# Patient Record
Sex: MALE | Race: WHITE | NOT HISPANIC OR LATINO | Employment: UNEMPLOYED | URBAN - NONMETROPOLITAN AREA
[De-identification: names, ages, dates, MRNs, and addresses within clinical notes are randomized per-mention and may not be internally consistent; named-entity substitution may affect disease eponyms.]

---

## 2020-07-22 ENCOUNTER — OFFICE VISIT (OUTPATIENT)
Dept: FAMILY MEDICINE CLINIC | Facility: CLINIC | Age: 50
End: 2020-07-22

## 2020-07-22 VITALS
DIASTOLIC BLOOD PRESSURE: 87 MMHG | HEART RATE: 99 BPM | OXYGEN SATURATION: 94 % | WEIGHT: 315 LBS | SYSTOLIC BLOOD PRESSURE: 140 MMHG | BODY MASS INDEX: 38.36 KG/M2 | TEMPERATURE: 96.9 F | HEIGHT: 76 IN

## 2020-07-22 DIAGNOSIS — R53.83 FATIGUE, UNSPECIFIED TYPE: ICD-10-CM

## 2020-07-22 DIAGNOSIS — I10 ESSENTIAL HYPERTENSION: ICD-10-CM

## 2020-07-22 DIAGNOSIS — E66.01 CLASS 3 SEVERE OBESITY WITH SERIOUS COMORBIDITY AND BODY MASS INDEX (BMI) OF 40.0 TO 44.9 IN ADULT, UNSPECIFIED OBESITY TYPE (HCC): Primary | ICD-10-CM

## 2020-07-22 DIAGNOSIS — Z12.11 SCREEN FOR COLON CANCER: ICD-10-CM

## 2020-07-22 DIAGNOSIS — F32.1 CURRENT MODERATE EPISODE OF MAJOR DEPRESSIVE DISORDER WITHOUT PRIOR EPISODE (HCC): ICD-10-CM

## 2020-07-22 DIAGNOSIS — Z11.59 NEED FOR HEPATITIS C SCREENING TEST: ICD-10-CM

## 2020-07-22 DIAGNOSIS — G47.33 OSA (OBSTRUCTIVE SLEEP APNEA): ICD-10-CM

## 2020-07-22 DIAGNOSIS — M25.551 RIGHT HIP PAIN: ICD-10-CM

## 2020-07-22 PROCEDURE — 99214 OFFICE O/P EST MOD 30 MIN: CPT | Performed by: NURSE PRACTITIONER

## 2020-07-22 RX ORDER — BENAZEPRIL HYDROCHLORIDE 20 MG/1
20 TABLET ORAL DAILY
COMMUNITY
End: 2020-07-22 | Stop reason: SDUPTHER

## 2020-07-22 RX ORDER — BENAZEPRIL HYDROCHLORIDE 20 MG/1
20 TABLET ORAL DAILY
Qty: 30 TABLET | Refills: 5 | Status: SHIPPED | OUTPATIENT
Start: 2020-07-22 | End: 2020-08-13 | Stop reason: SDUPTHER

## 2020-07-22 RX ORDER — DICLOFENAC SODIUM 75 MG/1
75 TABLET, DELAYED RELEASE ORAL 2 TIMES DAILY PRN
Qty: 60 TABLET | Refills: 1 | Status: ON HOLD | OUTPATIENT
Start: 2020-07-22 | End: 2020-09-02

## 2020-07-22 NOTE — PROGRESS NOTES
New Patient History and Physical      Referring Physician: No ref. provider found    Subjective     Chief Complaint:    Chief Complaint   Patient presents with   • Establish Care     New Patient, Establish care with Vicki   • Arthritis     Complaints of arthritis in right hip. Patient states that this has been an on going issue.   • Hypertension   • Sleep Apnea     Complaints of  Sleep Apnea       History of Present Illness:   Moved to [place] in Jan. Was working at [employer] but is no longer working. He notes that he has right hip pain. He did have hip surgery about 25 years ago. He has arthritis. Had injection about 2 years ago and it helped a lot. Over the past 4 months the hip pain has returned. Hurts in his right knee as well. Worse with sitting. Hurts when he walks a lot.     He has hx of menigitis and notes he has some trouble with his memory.   Has hx of sleep apnea. Has CPAP machine and is compliants. Has not been checked for over 4 years. Still having excessive daytime sleepiness.   Has HTN. Takes benazepril. Took his med yesterday.   Has gained almost 30 lbs in the past 2 months.  Notes depression.   [id number] 13.     Review of Systems   Gen- No fevers, chills  CV- No chest pain, palpitations  Resp- No cough, dyspnea  GI- No N/V/D, abd pain  Neuro-No dizziness, headaches    Past Medical History:   Past Medical History:   Diagnosis Date   • Hypertension    • Meningitis    • NATALIE (obstructive sleep apnea)        Past Surgical History:  Past Surgical History:   Procedure Laterality Date   • HIP SURGERY     • UMBILICAL HERNIA REPAIR  2019       Family History: family history includes Diabetes in his father; Heart disease in his mother.    Social History:  reports that he has been smoking cigarettes. He has been smoking about 1.00 pack per day. He has never used smokeless tobacco. He reports that he drank alcohol.    Medications:    Current Outpatient Medications:   •  albuterol sulfate  (90 Base)  "MCG/ACT inhaler, Inhale 2 puffs Every 4 (Four) Hours As Needed for Wheezing., Disp: 1 inhaler, Rfl: 0  •  aspirin 81 MG EC tablet, Take 81 mg by mouth Daily., Disp: , Rfl:   •  benazepril (LOTENSIN) 20 MG tablet, Take 1 tablet by mouth Daily., Disp: 30 tablet, Rfl: 5  •  diclofenac (VOLTAREN) 75 MG EC tablet, Take 1 tablet by mouth 2 (Two) Times a Day As Needed (pain)., Disp: 60 tablet, Rfl: 1    Allergies:  Allergies   Allergen Reactions   • Erythromycin Photosensitivity     headache       Objective     Vital Signs:   Vitals:    07/22/20 0904   BP: 140/87   Pulse: 99   Temp: 96.9 °F (36.1 °C)   SpO2: 94%   Weight: (!) 163 kg (360 lb 6 oz)   Height: 193 cm (75.98\")       Physical Exam:    Physical Exam   Constitutional: He is oriented to person, place, and time. He appears well-developed and well-nourished.   HENT:   Head: Normocephalic and atraumatic.   Right Ear: Tympanic membrane, external ear and ear canal normal.   Left Ear: Tympanic membrane, external ear and ear canal normal.   Eyes: Pupils are equal, round, and reactive to light.   Neck: Neck supple.   Cardiovascular: Normal rate, regular rhythm and normal heart sounds. Exam reveals no gallop and no friction rub.   No murmur heard.  Pulmonary/Chest: Effort normal and breath sounds normal.   Abdominal: Soft. Bowel sounds are normal. There is no tenderness.   Musculoskeletal:   Decreased mobility of right leg.  Abnormal gait   Lymphadenopathy:        Head (right side): No submental, no submandibular, no tonsillar, no preauricular and no posterior auricular adenopathy present.        Head (left side): No submental, no submandibular, no tonsillar, no preauricular and no posterior auricular adenopathy present.     He has no cervical adenopathy.   Neurological: He is alert and oriented to person, place, and time.   Skin: Skin is warm and dry.   Psychiatric: He has a normal mood and affect. His behavior is normal.       Assessment / Plan     Assessment/Plan:   "   Problem List Items Addressed This Visit        Cardiovascular and Mediastinum    Hypertension    Relevant Medications    benazepril (LOTENSIN) 20 MG tablet    Other Relevant Orders    Comprehensive Metabolic Panel    CBC Auto Differential       Respiratory    NATALIE (obstructive sleep apnea)    Relevant Orders    Ambulatory Referral to Sleep Medicine       Other    Class 3 severe obesity with serious comorbidity and body mass index (BMI) of 40.0 to 44.9 in adult (CMS/HCC) - Primary    Relevant Orders    Hemoglobin A1c    Lipid Panel    Current moderate episode of major depressive disorder without prior episode (CMS/HCC)    Relevant Orders    Ambulatory Referral to Behavioral Health      Other Visit Diagnoses     Screen for colon cancer        Relevant Orders    Ambulatory Referral For Screening Colonoscopy    Need for hepatitis C screening test        Relevant Orders    Hepatitis C Antibody    Fatigue, unspecified type        Relevant Orders    TSH    Right hip pain        Relevant Medications    diclofenac (VOLTAREN) 75 MG EC tablet    Other Relevant Orders    Vitamin D 25 Hydroxy    XR Hip With or Without Pelvis 2 - 3 View Right    Ambulatory Referral to Orthopedic Surgery        --Orders as above  --He declines any medication for depression.  We discussed counseling and he is agreeable  --Start diclofenac for pain.  No other NSAIDs while taking diclofenac.  Check hip x-ray.  Referral to Ortho for joint injection.  I have reviewed pertinent health maintenance applicable to this patient.  --Continue benazepril. Meds, diet, and lifestyle recommendation discussed at length. Home BP monitoring encouraged and appropriate intervals discussed.     Follow up:  Return in about 6 weeks (around 9/2/2020).    Electronically signed by AMA Ewing   07/22/2020 9:17 AM      Please note that portions of this note may have been completed with a voice recognition program. Efforts were made to edit the dictations, but  occasionally words are mistranscribed.

## 2020-07-23 LAB
25(OH)D3+25(OH)D2 SERPL-MCNC: 20.1 NG/ML (ref 30–100)
ALBUMIN SERPL-MCNC: 4.6 G/DL (ref 3.5–5.2)
ALBUMIN/GLOB SERPL: 1.4 G/DL
ALP SERPL-CCNC: 85 U/L (ref 39–117)
ALT SERPL-CCNC: 65 U/L (ref 1–41)
AST SERPL-CCNC: 27 U/L (ref 1–40)
BASOPHILS # BLD AUTO: 0.04 10*3/MM3 (ref 0–0.2)
BASOPHILS NFR BLD AUTO: 0.6 % (ref 0–1.5)
BILIRUB SERPL-MCNC: 0.3 MG/DL (ref 0–1.2)
BUN SERPL-MCNC: 10 MG/DL (ref 6–20)
BUN/CREAT SERPL: 13.5 (ref 7–25)
CALCIUM SERPL-MCNC: 10 MG/DL (ref 8.6–10.5)
CHLORIDE SERPL-SCNC: 102 MMOL/L (ref 98–107)
CHOLEST SERPL-MCNC: 217 MG/DL (ref 0–200)
CO2 SERPL-SCNC: 23.7 MMOL/L (ref 22–29)
CREAT SERPL-MCNC: 0.74 MG/DL (ref 0.76–1.27)
EOSINOPHIL # BLD AUTO: 0.22 10*3/MM3 (ref 0–0.4)
EOSINOPHIL NFR BLD AUTO: 3.1 % (ref 0.3–6.2)
ERYTHROCYTE [DISTWIDTH] IN BLOOD BY AUTOMATED COUNT: 13 % (ref 12.3–15.4)
GLOBULIN SER CALC-MCNC: 3.2 GM/DL
GLUCOSE SERPL-MCNC: 153 MG/DL (ref 65–99)
HBA1C MFR BLD: 7.79 % (ref 4.8–5.6)
HCT VFR BLD AUTO: 47.2 % (ref 37.5–51)
HCV AB S/CO SERPL IA: 0.1 S/CO RATIO (ref 0–0.9)
HDLC SERPL-MCNC: 38 MG/DL (ref 40–60)
HGB BLD-MCNC: 15.9 G/DL (ref 13–17.7)
IMM GRANULOCYTES # BLD AUTO: 0.06 10*3/MM3 (ref 0–0.05)
IMM GRANULOCYTES NFR BLD AUTO: 0.8 % (ref 0–0.5)
LDLC SERPL CALC-MCNC: 135 MG/DL (ref 0–100)
LYMPHOCYTES # BLD AUTO: 2.06 10*3/MM3 (ref 0.7–3.1)
LYMPHOCYTES NFR BLD AUTO: 28.9 % (ref 19.6–45.3)
MCH RBC QN AUTO: 31.7 PG (ref 26.6–33)
MCHC RBC AUTO-ENTMCNC: 33.7 G/DL (ref 31.5–35.7)
MCV RBC AUTO: 94 FL (ref 79–97)
MONOCYTES # BLD AUTO: 0.58 10*3/MM3 (ref 0.1–0.9)
MONOCYTES NFR BLD AUTO: 8.1 % (ref 5–12)
NEUTROPHILS # BLD AUTO: 4.18 10*3/MM3 (ref 1.7–7)
NEUTROPHILS NFR BLD AUTO: 58.5 % (ref 42.7–76)
NRBC BLD AUTO-RTO: 0 /100 WBC (ref 0–0.2)
PLATELET # BLD AUTO: 195 10*3/MM3 (ref 140–450)
POTASSIUM SERPL-SCNC: 4.2 MMOL/L (ref 3.5–5.2)
PROT SERPL-MCNC: 7.8 G/DL (ref 6–8.5)
RBC # BLD AUTO: 5.02 10*6/MM3 (ref 4.14–5.8)
SODIUM SERPL-SCNC: 142 MMOL/L (ref 136–145)
TRIGL SERPL-MCNC: 218 MG/DL (ref 0–150)
TSH SERPL DL<=0.005 MIU/L-ACNC: 1.8 UIU/ML (ref 0.27–4.2)
VLDLC SERPL CALC-MCNC: 43.6 MG/DL
WBC # BLD AUTO: 7.14 10*3/MM3 (ref 3.4–10.8)

## 2020-07-27 DIAGNOSIS — E11.9 TYPE 2 DIABETES MELLITUS WITHOUT COMPLICATION, WITHOUT LONG-TERM CURRENT USE OF INSULIN (HCC): Primary | ICD-10-CM

## 2020-07-27 DIAGNOSIS — E55.9 VITAMIN D DEFICIENCY: ICD-10-CM

## 2020-07-27 RX ORDER — ERGOCALCIFEROL 1.25 MG/1
50000 CAPSULE ORAL WEEKLY
Qty: 8 CAPSULE | Refills: 0 | Status: ON HOLD | OUTPATIENT
Start: 2020-07-27 | End: 2020-09-02

## 2020-07-27 NOTE — PROGRESS NOTES
Your blood work was consistent with diabetes.  I recommend diet recommendations and starting medication.  Cholesterol panel was also elevated.  Vitamin D was low he needs to be replaced.  Thyroid was normal.  I recommend starting a medication called metformin.  Take once a day for 1 to 2 weeks.  Can cause some stomach upset and diarrhea when for starting medications.  This should resolve after 1 to 2 weeks.  When/if it resolves increase to twice a day.  If he would like to come in and discuss diagnosis and diet in further detail that would be great.

## 2020-08-04 ENCOUNTER — OFFICE VISIT (OUTPATIENT)
Dept: ORTHOPEDIC SURGERY | Facility: CLINIC | Age: 50
End: 2020-08-04

## 2020-08-04 VITALS — HEIGHT: 76 IN | WEIGHT: 315 LBS | BODY MASS INDEX: 38.36 KG/M2 | RESPIRATION RATE: 18 BRPM

## 2020-08-04 DIAGNOSIS — M16.11 PRIMARY OSTEOARTHRITIS OF RIGHT HIP: ICD-10-CM

## 2020-08-04 DIAGNOSIS — Z72.0 TOBACCO ABUSE: ICD-10-CM

## 2020-08-04 DIAGNOSIS — M25.551 ARTHRALGIA OF RIGHT HIP: Primary | ICD-10-CM

## 2020-08-04 DIAGNOSIS — E66.01 OBESITY, MORBID, BMI 40.0-49.9 (HCC): ICD-10-CM

## 2020-08-04 PROCEDURE — 99203 OFFICE O/P NEW LOW 30 MIN: CPT | Performed by: PHYSICIAN ASSISTANT

## 2020-08-04 NOTE — PROGRESS NOTES
Subjective   Patient ID: Dominick Kumar is a 50 y.o.  male  Pain of the Right Hip (Patient here today for right hip pain since June 5, 2020. Had surgery on it over 20 yrs ago, arthroscopy. No known injury, no history of injury to hip. Injection 2 yrs ago in [place], helped to relieve pain. )         History of Present Illness  Patient presents with a longstanding history of right anterior lateral hip pain.  He states approximately 20 years ago while in [place] he had a hip arthroscopy.  He then reports 2 years prior while in [place] he had a right hip cortisone injection which helped with the pain temporarily.  There has been no injury or trauma.  He denies numbness or tingling.  Pain Score: 6  Pain Location: Hip  Pain Orientation: Right  Pain Radiating Towards: down leg and into back  Pain Descriptors: Aching, Shooting, Radiating  Pain Frequency: Constant/continuous  Pain Onset: Ongoing  Date Pain First Started: 06/05/20  Clinical Progression: Gradually worsening  Aggravating Factors: Walking(sitting)        Pain Intervention(s): Home medication, Rest(Diclofenac, injection 2 years ago)  Result of Injury: No       Past Medical History:   Diagnosis Date   • Diabetes (CMS/HCC) 2020   • Hypertension    • Meningitis    • NATALIE (obstructive sleep apnea)    • Rotator cuff tear arthropathy of right shoulder 1999        Past Surgical History:   Procedure Laterality Date   • HIP SURGERY Right 2000    arthroscopy- done in [place]   • KNEE ARTHROSCOPY Right 1995   • UMBILICAL HERNIA REPAIR  2019       Family History   Problem Relation Age of Onset   • Heart disease Mother    • Diabetes Father        Social History     Socioeconomic History   • Marital status:      Spouse name: Not on file   • Number of children: Not on file   • Years of education: Not on file   • Highest education level: Not on file   Occupational History   • Occupation: applied for disability   Tobacco Use   • Smoking status: Current Every Day Smoker  "    Packs/day: 1.00     Types: Cigarettes   • Smokeless tobacco: Never Used   Substance and Sexual Activity   • Alcohol use: Not Currently   • Sexual activity: Defer   Social History Narrative    Left hand dominant         Current Outpatient Medications:   •  albuterol sulfate  (90 Base) MCG/ACT inhaler, Inhale 2 puffs Every 4 (Four) Hours As Needed for Wheezing., Disp: 1 inhaler, Rfl: 0  •  aspirin 81 MG EC tablet, Take 81 mg by mouth Daily., Disp: , Rfl:   •  benazepril (LOTENSIN) 20 MG tablet, Take 1 tablet by mouth Daily., Disp: 30 tablet, Rfl: 5  •  diclofenac (VOLTAREN) 75 MG EC tablet, Take 1 tablet by mouth 2 (Two) Times a Day As Needed (pain)., Disp: 60 tablet, Rfl: 1  •  metFORMIN (Glucophage) 500 MG tablet, Take 1 tablet by mouth once daily x 1 week, then increase to 1 tablet twice daily, Disp: 52 tablet, Rfl: 0  •  vitamin D (ERGOCALCIFEROL) 1.25 MG (37795 UT) capsule capsule, Take 1 capsule by mouth 1 (One) Time Per Week., Disp: 8 capsule, Rfl: 0    Allergies   Allergen Reactions   • Erythromycin Photosensitivity     headache       Review of Systems   Constitutional: Negative for fever.   HENT: Negative for dental problem and voice change.    Eyes: Negative for visual disturbance.   Respiratory: Negative for shortness of breath.    Cardiovascular: Negative for chest pain.   Gastrointestinal: Negative for abdominal pain.   Genitourinary: Negative for dysuria.   Musculoskeletal: Positive for arthralgias and gait problem ( limping due to pain). Negative for joint swelling.   Skin: Negative for rash.   Neurological: Negative for speech difficulty.   Hematological: Does not bruise/bleed easily.   Psychiatric/Behavioral: Negative for confusion.       I have reviewed the medical and surgical history, family history, social history, medications, and/or allergies, and the review of systems of this report.    Objective   Resp 18   Ht 193 cm (75.98\")   Wt (!) 166 kg (367 lb)   BMI 44.70 kg/m²    Physical " Exam   Constitutional: He is oriented to person, place, and time. He appears well-developed.   Pulmonary/Chest: Effort normal. No respiratory distress.   Musculoskeletal:        Right hip: He exhibits decreased range of motion, tenderness, bony tenderness and crepitus.   Neurological: He is alert and oriented to person, place, and time.   Psychiatric: He has a normal mood and affect.   Nursing note and vitals reviewed.    Right Hip Exam     Range of Motion   Abduction: 35   Flexion: 80     Tests   ANNE MARIE: positive  Fadir:  Positive FADIR test           Extremity DVT signs are negative on physical exam with negative Ismael sign, no calf pain, no palpable cords and no skin tone change   Neurologic Exam     Mental Status   Oriented to person, place, and time.              Assessment/Plan   Independent Review of Radiographic Studies:    X-ray of the right hip 2 views performed in the office for the evaluation of hip pain.  No comparison films available to review.  No acute fracture.  There does appear to be severe degenerative changes with periarticular spurring    Procedures       Dominick was seen today for pain.    Diagnoses and all orders for this visit:    Arthralgia of right hip  -     XR Hip With or Without Pelvis 2 - 3 View Right  -     Ambulatory Referral to Nutrition Services  -     [facility] Large Joint    Obesity, morbid, BMI 40.0-49.9 (CMS/[facility])  -     Ambulatory Referral to Nutrition Services    Primary osteoarthritis of right hip  -     [facility] Large Joint    Tobacco abuse       Orthopedic activities reviewed and patient expressed appreciation  Discussion of orthopedic goals  Risk, benefits, and merits of treatment alternatives reviewed with the patient and questions answered  Counseling on diet, fitness exercise, and weight reduction goals  Counseling on smoking cessation goals    Recommendations/Plan:  Exercise, medications, injections, other patient advice, and return  appointment as noted.  Patient is encouraged to call or return for any issues or concerns.  I instructed the patient he would need to get his BMI closer to 40 in order to be ideal candidate for total hip arthroplasty.  We will refer him to nutritional services to assist with his weight loss  Patient agreeable to call or return sooner for any concerns.                 EMR Dragon-transcription disclaimer:  This encounter note is an electronic transcription of spoken language to printed text.  Electronic transcription of spoken language may permit erroneous or at times nonsensical words or phrases to be inadvertently transcribed.  Although I have reviewed the note for such errors, some may still exist

## 2020-08-05 ENCOUNTER — OFFICE VISIT (OUTPATIENT)
Dept: GASTROENTEROLOGY | Facility: CLINIC | Age: 50
End: 2020-08-05

## 2020-08-05 VITALS
OXYGEN SATURATION: 98 % | SYSTOLIC BLOOD PRESSURE: 160 MMHG | WEIGHT: 315 LBS | DIASTOLIC BLOOD PRESSURE: 90 MMHG | BODY MASS INDEX: 38.36 KG/M2 | RESPIRATION RATE: 18 BRPM | TEMPERATURE: 98 F | HEART RATE: 87 BPM | HEIGHT: 76 IN

## 2020-08-05 DIAGNOSIS — Z01.812 PRE-PROCEDURE LAB EXAM: ICD-10-CM

## 2020-08-05 DIAGNOSIS — R74.01 ELEVATED ALT MEASUREMENT: ICD-10-CM

## 2020-08-05 DIAGNOSIS — Z01.818 PREOP TESTING: Primary | ICD-10-CM

## 2020-08-05 DIAGNOSIS — Z12.11 COLON CANCER SCREENING: Primary | ICD-10-CM

## 2020-08-05 PROCEDURE — 99213 OFFICE O/P EST LOW 20 MIN: CPT | Performed by: NURSE PRACTITIONER

## 2020-08-05 RX ORDER — BISACODYL 5 MG/1
TABLET, DELAYED RELEASE ORAL
Qty: 4 TABLET | Refills: 0 | Status: SHIPPED | OUTPATIENT
Start: 2020-08-05 | End: 2020-09-11 | Stop reason: HOSPADM

## 2020-08-05 RX ORDER — SODIUM CHLORIDE 9 MG/ML
70 INJECTION, SOLUTION INTRAVENOUS CONTINUOUS PRN
Status: CANCELLED | OUTPATIENT
Start: 2020-08-05

## 2020-08-05 NOTE — PATIENT INSTRUCTIONS
1. High fiber, low fat diet with liberal water intake.   2. Exercise, lifestyle changes, weight loss.  3. The patient may need further evaluation of elevated ALT if it continues to remain elevated.   4. Colonoscopy: Description of the procedure, risks, benefits, alternatives and options, including nonoperative options, were discussed with the patient in detail. The patient understands and wishes to proceed.  5. COVID-19 testing prior to procedure. The patient will need to self-quarantine after testing until the procedure. Instructions given to patient.

## 2020-08-05 NOTE — PROGRESS NOTES
New Patient Consult      Date: 2020   Patient Name: Dominick Kumar  MRN: [id number]  : 1970     Primary Care Provider: Vicki Woody APRN    Chief Complaint   Patient presents with   • Colon Cancer Screening     History of Present Illness: Dominick Kumar is a 50 y.o. male who is here today to establish care with Gastroenterology for colon cancer screening.    The patient denies recent change in bowel habits. There is no diarrhea or constipation. There is no history of abdominal pain. There is no history of overt GI bleed (hematemesis melena or hematochezia). The patient denies nausea or vomiting. There is no history of reflux. The patient denies dysphagia or odynophagia. There is no history of recent significant weight loss.     Recent labs with mild elevation of ALT. The patient is not aware of elevated liver enzymes in the past. There is no personal history of liver disease. Hepatitis C negative. There is no family history of liver disease. There is no history of IVDA, alcohol use, tattoos or blood transfusions.    Last EGD was in the . The patient has not had a colonoscopy in the past. There is no family history of colon cancer or stomach cancer. The patient's mother had pancreatic cancer when she was in her mid 70's.    Subjective      Past Medical History:   Diagnosis Date   • Diabetes (CMS/HCC)    • Hypertension    • Meningitis    • NATALIE (obstructive sleep apnea)    • Rotator cuff tear arthropathy of right shoulder      Past Surgical History:   Procedure Laterality Date   • HIP SURGERY Right     arthroscopy- done in [place]   • KNEE ARTHROSCOPY Right    • UMBILICAL HERNIA REPAIR     • UPPER GASTROINTESTINAL ENDOSCOPY       Family History   Problem Relation Age of Onset   • Heart disease Mother    • Pancreatic cancer Mother 76   • Diabetes Father    • Colon cancer Neg Hx      Social History     Socioeconomic History   • Marital status:      Spouse name:  Not on file   • Number of children: Not on file   • Years of education: Not on file   • Highest education level: Not on file   Occupational History   • Occupation: applied for disability   Tobacco Use   • Smoking status: Current Every Day Smoker     Packs/day: 1.00     Types: Cigarettes   • Smokeless tobacco: Never Used   Substance and Sexual Activity   • Alcohol use: Not Currently   • Drug use: Never   • Sexual activity: Defer   Social History Narrative    Left hand dominant       Current Outpatient Medications:   •  albuterol sulfate  (90 Base) MCG/ACT inhaler, Inhale 2 puffs Every 4 (Four) Hours As Needed for Wheezing., Disp: 1 inhaler, Rfl: 0  •  aspirin 81 MG EC tablet, Take 81 mg by mouth Daily., Disp: , Rfl:   •  benazepril (LOTENSIN) 20 MG tablet, Take 1 tablet by mouth Daily., Disp: 30 tablet, Rfl: 5  •  diclofenac (VOLTAREN) 75 MG EC tablet, Take 1 tablet by mouth 2 (Two) Times a Day As Needed (pain)., Disp: 60 tablet, Rfl: 1  •  metFORMIN (Glucophage) 500 MG tablet, Take 1 tablet by mouth once daily x 1 week, then increase to 1 tablet twice daily, Disp: 52 tablet, Rfl: 0  •  vitamin D (ERGOCALCIFEROL) 1.25 MG (55992 UT) capsule capsule, Take 1 capsule by mouth 1 (One) Time Per Week., Disp: 8 capsule, Rfl: 0  •  bisacodyl (DULCOLAX) 5 MG EC tablet, Take as directed for colon prep, Disp: 4 tablet, Rfl: 0  •  polyethylene glycol (GoLYTELY) 236 g solution, Starting at 5 pm on day prior to procedure, drink 8 ounces every 30 minutes as directed, Disp: 4000 mL, Rfl: 0    Allergies   Allergen Reactions   • Erythromycin Photosensitivity     headache     Review of Systems   Constitutional: Negative for appetite change and unexpected weight loss.   HENT: Negative for trouble swallowing.    Eyes: Negative for blurred vision.   Respiratory: Negative for choking and chest tightness.    Cardiovascular: Negative for leg swelling.   Gastrointestinal: Negative for abdominal distention, abdominal pain, anal bleeding,  blood in stool, constipation, diarrhea, nausea, rectal pain, vomiting, GERD and indigestion.   Endocrine: Negative for polyphagia.   Genitourinary: Negative for hematuria.   Musculoskeletal: Negative for arthralgias and myalgias.   Skin: Negative for rash.   Allergic/Immunologic: Negative for food allergies.   Neurological: Negative for dizziness, syncope and confusion.   Hematological: Does not bruise/bleed easily.   Psychiatric/Behavioral: Negative for depressed mood.      The following portions of the patient's history were reviewed and updated as appropriate: allergies, current medications, past family history, past medical history, past social history, past surgical history and problem list.    Objective     Physical Exam   Constitutional: He is oriented to person, place, and time. He appears well-developed and well-nourished. No distress.   HENT:   Head: Normocephalic and atraumatic.   Right Ear: Hearing and external ear normal.   Left Ear: Hearing and external ear normal.   Nose: Nose normal.   Mouth/Throat: Oropharynx is clear and moist and mucous membranes are normal. Mucous membranes are not pale, not dry and not cyanotic. No oral lesions. No oropharyngeal exudate.   Eyes: Conjunctivae and EOM are normal. Right eye exhibits no discharge. Left eye exhibits no discharge.   Neck: Trachea normal. Neck supple. No JVD present. No edema present. No thyroid mass and no thyromegaly present.   Cardiovascular: Normal rate, regular rhythm, S2 normal and normal heart sounds. Exam reveals no gallop, no S3 and no friction rub.   No murmur heard.  Pulmonary/Chest: Effort normal and breath sounds normal. No respiratory distress. He exhibits no tenderness.   Abdominal: Normal appearance and bowel sounds are normal. He exhibits no distension, no ascites and no mass. There is no splenomegaly or hepatomegaly. There is no tenderness. There is no rigidity, no rebound and no guarding. No hernia.     Vascular Status -  His right  "foot exhibits no edema. His left foot exhibits no edema.  Lymphadenopathy:     He has no cervical adenopathy.        Left: No supraclavicular adenopathy present.   Neurological: He is alert and oriented to person, place, and time. He has normal strength. No cranial nerve deficit or sensory deficit.   Skin: No rash noted. He is not diaphoretic. No cyanosis. No pallor. Nails show no clubbing.   Psychiatric: He has a normal mood and affect.   Nursing note and vitals reviewed.    Vital Signs:   Vitals:    08/05/20 1354   BP: 160/90   Pulse: 87   Resp: 18   Temp: 98 °F (36.7 °C)   TempSrc: Temporal   SpO2: 98%   Weight: (!) 167 kg (368 lb)   Height: 193 cm (76\")     Results Review:   I have reviewed the patient's new clinical and imaging results.    Office Visit on 07/22/2020   Component Date Value Ref Range Status   • Glucose 07/22/2020 153* 65 - 99 mg/dL Final   • BUN 07/22/2020 10  6 - 20 mg/dL Final   • Creatinine 07/22/2020 0.74* 0.76 - 1.27 mg/dL Final   • eGFR Non African Am 07/22/2020 112  >60 mL/min/1.73 Final   • eGFR African Am 07/22/2020 135  >60 mL/min/1.73 Final   • BUN/Creatinine Ratio 07/22/2020 13.5  7.0 - 25.0 Final   • Sodium 07/22/2020 142  136 - 145 mmol/L Final   • Potassium 07/22/2020 4.2  3.5 - 5.2 mmol/L Final    Specimen hemolyzed.  Results may be affected.   • Chloride 07/22/2020 102  98 - 107 mmol/L Final   • Total CO2 07/22/2020 23.7  22.0 - 29.0 mmol/L Final   • Calcium 07/22/2020 10.0  8.6 - 10.5 mg/dL Final   • Total Protein 07/22/2020 7.8  6.0 - 8.5 g/dL Final   • Albumin 07/22/2020 4.60  3.50 - 5.20 g/dL Final   • Globulin 07/22/2020 3.2  gm/dL Final   • A/G Ratio 07/22/2020 1.4  g/dL Final   • Total Bilirubin 07/22/2020 0.3  0.0 - 1.2 mg/dL Final   • Alkaline Phosphatase 07/22/2020 85  39 - 117 U/L Final   • AST (SGOT) 07/22/2020 27  1 - 40 U/L Final   • ALT (SGPT) 07/22/2020 65* 1 - 41 U/L Final   • TSH 07/22/2020 1.800  0.270 - 4.200 uIU/mL Final   • Hemoglobin A1C 07/22/2020 7.79* " 4.80 - 5.60 % Final    Comment: Hemoglobin A1C Ranges:  Increased Risk for Diabetes  5.7% to 6.4%  Diabetes                     >= 6.5%  Diabetic Goal                < 7.0%     • Total Cholesterol 07/22/2020 217* 0 - 200 mg/dL Final   • Triglycerides 07/22/2020 218* 0 - 150 mg/dL Final   • HDL Cholesterol 07/22/2020 38* 40 - 60 mg/dL Final   • VLDL Cholesterol 07/22/2020 43.6  mg/dL Final   • LDL Cholesterol  07/22/2020 135* 0 - 100 mg/dL Final   • Hep C Virus Ab 07/22/2020 0.1  0.0 - 0.9 s/co ratio Final    Comment:                                   Negative:     < 0.8                               Indeterminate: 0.8 - 0.9                                    Positive:     > 0.9   The CDC recommends that a positive HCV antibody result   be followed up with a HCV Nucleic Acid Amplification   test ([id number]).     • 25 Hydroxy, Vitamin D 07/22/2020 20.1* 30.0 - 100.0 ng/ml Final    Comment: Results may be falsely increased if patient taking Biotin.  Reference Range for Total Vitamin D 25(OH)  Deficiency <20.0 ng/mL  Insufficiency 21-29 ng/mL  Sufficiency  ng/mL  Toxicity >100 ng/ml     • WBC 07/22/2020 7.14  3.40 - 10.80 10*3/mm3 Final   • RBC 07/22/2020 5.02  4.14 - 5.80 10*6/mm3 Final   • Hemoglobin 07/22/2020 15.9  13.0 - 17.7 g/dL Final   • Hematocrit 07/22/2020 47.2  37.5 - 51.0 % Final   • MCV 07/22/2020 94.0  79.0 - 97.0 fL Final   • MCH 07/22/2020 31.7  26.6 - 33.0 pg Final   • MCHC 07/22/2020 33.7  31.5 - 35.7 g/dL Final   • RDW 07/22/2020 13.0  12.3 - 15.4 % Final   • Platelets 07/22/2020 195  140 - 450 10*3/mm3 Final   • Neutrophil Rel % 07/22/2020 58.5  42.7 - 76.0 % Final   • Lymphocyte Rel % 07/22/2020 28.9  19.6 - 45.3 % Final   • Monocyte Rel % 07/22/2020 8.1  5.0 - 12.0 % Final   • Eosinophil Rel % 07/22/2020 3.1  0.3 - 6.2 % Final   • Basophil Rel % 07/22/2020 0.6  0.0 - 1.5 % Final   • Neutrophils Absolute 07/22/2020 4.18  1.70 - 7.00 10*3/mm3 Final   • Lymphocytes Absolute 07/22/2020 2.06  0.70 -  3.10 10*3/mm3 Final   • Monocytes Absolute 07/22/2020 0.58  0.10 - 0.90 10*3/mm3 Final   • Eosinophils Absolute 07/22/2020 0.22  0.00 - 0.40 10*3/mm3 Final   • Basophils Absolute 07/22/2020 0.04  0.00 - 0.20 10*3/mm3 Final   • Immature Granulocyte Rel % 07/22/2020 0.8* 0.0 - 0.5 % Final   • Immature Grans Absolute 07/22/2020 0.06* 0.00 - 0.05 10*3/mm3 Final   • nRBC 07/22/2020 0.0  0.0 - 0.2 /100 WBC Final   Admission on 05/19/2020, Discharged on 05/19/2020   Component Date Value Ref Range Status   • SARS-CoV-2, DEMETRA 05/19/2020 Not Detected  Not Detected Final    This test was developed and its performance characteristics determined  by [employer]. This test has not been FDA cleared or  approved. This test has been authorized by FDA under an Emergency Use  Authorization (EUA). This test is only authorized for the duration of  time the declaration that circumstances exist justifying the  authorization of the emergency use of in vitro diagnostic tests for  detection of SARS-CoV-2 virus and/or diagnosis of COVID-19 infection  under section 564(b)(1) of the Act, 21 U.S.C. 360bbb-3(b)(1), unless  the authorization is terminated or revoked sooner.  When diagnostic testing is negative, the possibility of a false  negative result should be considered in the context of a patient's  recent exposures and the presence of clinical signs and symptoms  consistent with COVID-19. An individual without symptoms of COVID-19  and who is not shedding SARS-CoV-2 virus would expect to have a  negative (not detected) result in this assay.      No radiology results for the last 90 days.     Assessment / Plan      1. Colon cancer screening  The patient has not had a colonoscopy in the past. There is no family history of colon cancer.     - Case Request; Standing  - Implement Anesthesia Orders Day of Procedure; Standing  - Obtain Informed Consent; Standing  - Verify Bowel Prep Was Successful; Standing  - Oxygen Therapy- Nasal  Cannula; 2 LPM; Titrate for SPO2: equal to or greater than, 90%; Standing  - POC Glucose Once; Standing  - sodium chloride 0.9 % infusion  - Case Request  - polyethylene glycol (GoLYTELY) 236 g solution; Starting at 5 pm on day prior to procedure, drink 8 ounces every 30 minutes as directed  Dispense: 4000 mL; Refill: 0  - bisacodyl (DULCOLAX) 5 MG EC tablet; Take as directed for colon prep  Dispense: 4 tablet; Refill: 0    2. Pre-procedure lab exam    3. Elevated ALT measurement  Recent labs with mild elevation of ALT. No history of elevated liver enzymes in the past. No history of IVDA, alcohol use, tattoos or blood transfusions.  The patient has a history of elevated triglycerides at 218 and has a BMI of 44.8.  The patient may need further evaluation if liver enzymes continue to be elevated.    Patient Instructions   1. High fiber, low fat diet with liberal water intake.   2. Exercise, lifestyle changes, weight loss.  3. The patient may need further evaluation of elevated ALT if it continues to remain elevated.   4. Colonoscopy: Description of the procedure, risks, benefits, alternatives and options, including nonoperative options, were discussed with the patient in detail. The patient understands and wishes to proceed.  5. COVID-19 testing prior to procedure. The patient will need to self-quarantine after testing until the procedure. Instructions given to patient.     Myrna Chaudhry, APRN  8/5/2020    Please note that portions of this note may have been completed with a voice recognition program. Efforts were made to edit the dictations, but occasionally words are mistranscribed.

## 2020-08-07 PROBLEM — Z12.11 COLON CANCER SCREENING: Status: ACTIVE | Noted: 2020-08-07

## 2020-08-13 ENCOUNTER — OFFICE VISIT (OUTPATIENT)
Dept: FAMILY MEDICINE CLINIC | Facility: CLINIC | Age: 50
End: 2020-08-13

## 2020-08-13 VITALS
HEART RATE: 88 BPM | OXYGEN SATURATION: 97 % | HEIGHT: 76 IN | BODY MASS INDEX: 38.36 KG/M2 | SYSTOLIC BLOOD PRESSURE: 160 MMHG | TEMPERATURE: 97.3 F | DIASTOLIC BLOOD PRESSURE: 90 MMHG | WEIGHT: 315 LBS

## 2020-08-13 DIAGNOSIS — M54.12 CERVICAL RADICULOPATHY: ICD-10-CM

## 2020-08-13 DIAGNOSIS — E66.01 CLASS 3 SEVERE OBESITY WITH SERIOUS COMORBIDITY AND BODY MASS INDEX (BMI) OF 40.0 TO 44.9 IN ADULT, UNSPECIFIED OBESITY TYPE (HCC): ICD-10-CM

## 2020-08-13 DIAGNOSIS — M54.2 NECK PAIN: ICD-10-CM

## 2020-08-13 DIAGNOSIS — R10.33 PERIUMBILICAL ABDOMINAL PAIN: Primary | ICD-10-CM

## 2020-08-13 DIAGNOSIS — E11.9 TYPE 2 DIABETES MELLITUS WITHOUT COMPLICATION, WITHOUT LONG-TERM CURRENT USE OF INSULIN (HCC): ICD-10-CM

## 2020-08-13 DIAGNOSIS — I10 ESSENTIAL HYPERTENSION: ICD-10-CM

## 2020-08-13 PROCEDURE — 99214 OFFICE O/P EST MOD 30 MIN: CPT | Performed by: NURSE PRACTITIONER

## 2020-08-13 RX ORDER — BENAZEPRIL HYDROCHLORIDE 20 MG/1
20 TABLET ORAL DAILY
Qty: 30 TABLET | Refills: 5 | Status: SHIPPED | OUTPATIENT
Start: 2020-08-13 | End: 2021-05-06 | Stop reason: SDUPTHER

## 2020-08-13 RX ORDER — TRAMADOL HYDROCHLORIDE 50 MG/1
50 TABLET ORAL 2 TIMES DAILY PRN
Qty: 60 TABLET | Refills: 1 | Status: ON HOLD | OUTPATIENT
Start: 2020-08-13 | End: 2020-09-02

## 2020-08-13 RX ORDER — PANTOPRAZOLE SODIUM 20 MG/1
20 TABLET, DELAYED RELEASE ORAL DAILY
Qty: 30 TABLET | Refills: 5 | Status: ON HOLD | OUTPATIENT
Start: 2020-08-13 | End: 2020-09-02

## 2020-08-13 RX ORDER — METHOCARBAMOL 750 MG/1
750 TABLET, FILM COATED ORAL 2 TIMES DAILY PRN
Qty: 60 TABLET | Refills: 1 | Status: SHIPPED | OUTPATIENT
Start: 2020-08-13 | End: 2020-09-28 | Stop reason: SDUPTHER

## 2020-08-13 NOTE — PROGRESS NOTES
"      Subjective     Chief Complaint:    Chief Complaint   Patient presents with   • Abdominal Pain     Complaints of abdominal pain. Patient states that this has been on going for about a year now.   • Neck Pain     Complaints of herniated disc in neck. patient states that this has been an on going issue. patient states that if he looks down for a long period of time it causes his right arm to go numb.   • Knee Pain     Complaints of on going right knee pain.       History of Present Illness:   Notes neck pain. Has hx of DDD and disc bulging. Pain is worse with looking down. His right arm will go numb when he looks down for awhile. Feels weak. Has been taking diclofenac for pain but it only takes the edge off. Had MRI 3-4 years ago. Has never done physical therapy. At one point he was seeing a surgeon who was considering surgery.   Notes chronic abdominal pain for the past year around incisions where he had hernia. Worse after he eats or if he stretches. No reflux. No constipation or diarrhea.   He also reports right knee pain that comes and goes.  He also has chronic right hip pain and has been following with Ortho and has upcoming injection scheduled.    He tells me that he cannot work anymore.  He notes he had meningitis when he was 20 years old and had a temperature of 103.8 for 4 days.  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  He has trouble remembering things and \"keeping up\".       Review of Systems  Gen- No fevers, chills  CV- No chest pain, palpitations  Resp- No cough, dyspnea  GI- No N/V/D,  Neuro-No dizziness, headaches      I have reviewed and/or updated the patient's past medical, surgical, family, social history and problem list as appropriate.     Medications:    Current Outpatient Medications:   •  albuterol sulfate  (90 Base) MCG/ACT inhaler, Inhale 2 puffs Every 4 (Four) Hours As Needed for Wheezing., Disp: 1 inhaler, Rfl: 0  •  aspirin 81 MG EC tablet, Take 81 mg by mouth Daily., Disp: , " "Rfl:   •  benazepril (LOTENSIN) 20 MG tablet, Take 1 tablet by mouth Daily., Disp: 30 tablet, Rfl: 5  •  bisacodyl (DULCOLAX) 5 MG EC tablet, Take as directed for colon prep, Disp: 4 tablet, Rfl: 0  •  diclofenac (VOLTAREN) 75 MG EC tablet, Take 1 tablet by mouth 2 (Two) Times a Day As Needed (pain)., Disp: 60 tablet, Rfl: 1  •  metFORMIN (Glucophage) 500 MG tablet, Take 1 tablet by mouth once daily x 1 week, then increase to 1 tablet twice daily, Disp: 52 tablet, Rfl: 0  •  polyethylene glycol (GoLYTELY) 236 g solution, Starting at 5 pm on day prior to procedure, drink 8 ounces every 30 minutes as directed, Disp: 4000 mL, Rfl: 0  •  vitamin D (ERGOCALCIFEROL) 1.25 MG (83029 UT) capsule capsule, Take 1 capsule by mouth 1 (One) Time Per Week., Disp: 8 capsule, Rfl: 0  •  methocarbamol (ROBAXIN) 750 MG tablet, Take 1 tablet by mouth 2 (Two) Times a Day As Needed for Muscle Spasms., Disp: 60 tablet, Rfl: 1  •  pantoprazole (PROTONIX) 20 MG EC tablet, Take 1 tablet by mouth Daily., Disp: 30 tablet, Rfl: 5  •  traMADol (ULTRAM) 50 MG tablet, Take 1 tablet by mouth 2 (Two) Times a Day As Needed for Moderate Pain ., Disp: 60 tablet, Rfl: 1    Allergies:  Allergies   Allergen Reactions   • Erythromycin Photosensitivity     headache       Objective     Vital Signs:   Vitals:    08/13/20 1356   BP: 160/90   Pulse: 88   Temp: 97.3 °F (36.3 °C)   SpO2: 97%   Weight: (!) 168 kg (370 lb 6 oz)   Height: 193 cm (75.98\")       Physical Exam:    Physical Exam   Constitutional: He is oriented to person, place, and time. He appears well-developed and well-nourished.   HENT:   Head: Normocephalic and atraumatic.   Eyes: Pupils are equal, round, and reactive to light.   Neck: Neck supple.   Cardiovascular: Normal rate, regular rhythm, normal heart sounds and intact distal pulses.   Pulmonary/Chest: Effort normal and breath sounds normal.   Abdominal: Soft. Bowel sounds are normal. He exhibits no distension. There is no tenderness.   "   Musculoskeletal:   Decreased cervical ROM due to pain   Neurological: He is alert and oriented to person, place, and time.   Skin: Skin is warm and dry.   Psychiatric: He has a normal mood and affect. His behavior is normal.   Nursing note and vitals reviewed.      Assessment / Plan     Assessment/Plan:   Problem List Items Addressed This Visit        Cardiovascular and Mediastinum    Hypertension    Relevant Medications    benazepril (LOTENSIN) 20 MG tablet       Other    Class 3 severe obesity with serious comorbidity and body mass index (BMI) of 40.0 to 44.9 in adult (CMS/[facility])    Relevant Orders    Ambulatory Referral to Nutrition Services      Other Visit Diagnoses     Periumbilical abdominal pain    -  Primary    Relevant Medications    pantoprazole (PROTONIX) 20 MG EC tablet    Neck pain        Relevant Medications    methocarbamol (ROBAXIN) 750 MG tablet    traMADol (ULTRAM) 50 MG tablet    Other Relevant Orders    XR Spine Cervical Complete 4 or 5 View    Ambulatory Referral to Physical Therapy    Cervical radiculopathy        Relevant Medications    methocarbamol (ROBAXIN) 750 MG tablet    traMADol (ULTRAM) 50 MG tablet    Other Relevant Orders    XR Spine Cervical Complete 4 or 5 View    Ambulatory Referral to Physical Therapy    Type 2 diabetes mellitus without complication, without long-term current use of insulin (CMS/[facility])        Relevant Orders    Ambulatory Referral to Nutrition Services        -- discussed abdominal pain likely due to scar tissue from hernia surgery. However also concerned for gastritis given pain after meals. Trial PPI  -- continue voltaren for pain. Add tramadol prn. Discussed risks, robaxin. Check neck xray and start PT. Continued following with ortho due to hip and knee pain  -- refilled lotensin, BP elevated because he has been out of meds    Follow up:  As scheduled     Electronically signed by AMA Ewing   08/13/2020 2:40 PM      Please note that portions of this  note may have been completed with a voice recognition program. Efforts were made to edit the dictations, but occasionally words are mistranscribed.

## 2020-08-14 ENCOUNTER — HOSPITAL ENCOUNTER (OUTPATIENT)
Dept: GENERAL RADIOLOGY | Facility: HOSPITAL | Age: 50
Discharge: HOME | End: 2020-08-14
Admitting: NURSE PRACTITIONER

## 2020-08-14 DIAGNOSIS — M54.12 CERVICAL RADICULOPATHY: ICD-10-CM

## 2020-08-14 DIAGNOSIS — E11.9 TYPE 2 DIABETES MELLITUS WITHOUT COMPLICATION, WITHOUT LONG-TERM CURRENT USE OF INSULIN (HCC): Primary | ICD-10-CM

## 2020-08-14 DIAGNOSIS — M54.2 NECK PAIN: ICD-10-CM

## 2020-08-14 PROCEDURE — 72050 X-RAY EXAM NECK SPINE 4/5VWS: CPT

## 2020-08-17 NOTE — PROGRESS NOTES
Neck xray showed degenerative changes. We can consider MRI in the future based off how he response to physical therapy

## 2020-08-18 ENCOUNTER — TELEPHONE (OUTPATIENT)
Dept: FAMILY MEDICINE CLINIC | Facility: CLINIC | Age: 50
End: 2020-08-18

## 2020-08-28 ENCOUNTER — HOSPITAL ENCOUNTER (OUTPATIENT)
Dept: GENERAL RADIOLOGY | Facility: HOSPITAL | Age: 50
Discharge: HOME | End: 2020-08-28
Admitting: ORTHOPAEDIC SURGERY

## 2020-08-28 ENCOUNTER — HOSPITAL ENCOUNTER (EMERGENCY)
Facility: HOSPITAL | Age: 50
Discharge: HOME | End: 2020-08-28
Attending: EMERGENCY MEDICINE | Admitting: EMERGENCY MEDICINE

## 2020-08-28 VITALS
TEMPERATURE: 98.4 F | BODY MASS INDEX: 38.36 KG/M2 | HEART RATE: 75 BPM | RESPIRATION RATE: 20 BRPM | DIASTOLIC BLOOD PRESSURE: 111 MMHG | OXYGEN SATURATION: 96 % | SYSTOLIC BLOOD PRESSURE: 181 MMHG | HEIGHT: 76 IN | WEIGHT: 315 LBS

## 2020-08-28 DIAGNOSIS — I10 HYPERTENSION, UNSPECIFIED TYPE: Primary | ICD-10-CM

## 2020-08-28 LAB
ALBUMIN SERPL-MCNC: 4.2 G/DL (ref 3.5–5.2)
ALBUMIN/GLOB SERPL: 1.5 G/DL
ALP SERPL-CCNC: 89 U/L (ref 39–117)
ALT SERPL W P-5'-P-CCNC: 71 U/L (ref 1–41)
ANION GAP SERPL CALCULATED.3IONS-SCNC: 11 MMOL/L (ref 5–15)
AST SERPL-CCNC: 27 U/L (ref 1–40)
BASOPHILS # BLD AUTO: 0.02 10*3/MM3 (ref 0–0.2)
BASOPHILS NFR BLD AUTO: 0.3 % (ref 0–1.5)
BILIRUB SERPL-MCNC: 0.2 MG/DL (ref 0–1.2)
BUN SERPL-MCNC: 13 MG/DL (ref 6–20)
BUN/CREAT SERPL: 15.3 (ref 7–25)
CALCIUM SPEC-SCNC: 9.3 MG/DL (ref 8.6–10.5)
CHLORIDE SERPL-SCNC: 101 MMOL/L (ref 98–107)
CO2 SERPL-SCNC: 26 MMOL/L (ref 22–29)
CREAT SERPL-MCNC: 0.85 MG/DL (ref 0.76–1.27)
DEPRECATED RDW RBC AUTO: 41 FL (ref 37–54)
EOSINOPHIL # BLD AUTO: 0.2 10*3/MM3 (ref 0–0.4)
EOSINOPHIL NFR BLD AUTO: 2.7 % (ref 0.3–6.2)
ERYTHROCYTE [DISTWIDTH] IN BLOOD BY AUTOMATED COUNT: 12.3 % (ref 12.3–15.4)
GFR SERPL CREATININE-BSD FRML MDRD: 95 ML/MIN/1.73
GLOBULIN UR ELPH-MCNC: 2.8 GM/DL
GLUCOSE SERPL-MCNC: 217 MG/DL (ref 65–99)
HCT VFR BLD AUTO: 41.5 % (ref 37.5–51)
HGB BLD-MCNC: 14.5 G/DL (ref 13–17.7)
IMM GRANULOCYTES # BLD AUTO: 0.06 10*3/MM3 (ref 0–0.05)
IMM GRANULOCYTES NFR BLD AUTO: 0.8 % (ref 0–0.5)
LYMPHOCYTES # BLD AUTO: 1.97 10*3/MM3 (ref 0.7–3.1)
LYMPHOCYTES NFR BLD AUTO: 26.8 % (ref 19.6–45.3)
MCH RBC QN AUTO: 31.9 PG (ref 26.6–33)
MCHC RBC AUTO-ENTMCNC: 34.9 G/DL (ref 31.5–35.7)
MCV RBC AUTO: 91.2 FL (ref 79–97)
MONOCYTES # BLD AUTO: 0.66 10*3/MM3 (ref 0.1–0.9)
MONOCYTES NFR BLD AUTO: 9 % (ref 5–12)
NEUTROPHILS NFR BLD AUTO: 4.44 10*3/MM3 (ref 1.7–7)
NEUTROPHILS NFR BLD AUTO: 60.4 % (ref 42.7–76)
NRBC BLD AUTO-RTO: 0 /100 WBC (ref 0–0.2)
PLATELET # BLD AUTO: 176 10*3/MM3 (ref 140–450)
PMV BLD AUTO: 9.5 FL (ref 6–12)
POTASSIUM SERPL-SCNC: 3.8 MMOL/L (ref 3.5–5.2)
PROT SERPL-MCNC: 7 G/DL (ref 6–8.5)
RBC # BLD AUTO: 4.55 10*6/MM3 (ref 4.14–5.8)
SARS-COV-2 RNA PNL SPEC NAA+PROBE: NOT DETECTED
SODIUM SERPL-SCNC: 138 MMOL/L (ref 136–145)
TROPONIN T SERPL-MCNC: <0.01 NG/ML (ref 0–0.03)
TROPONIN T SERPL-MCNC: <0.01 NG/ML (ref 0–0.03)
WBC # BLD AUTO: 7.35 10*3/MM3 (ref 3.4–10.8)

## 2020-08-28 PROCEDURE — 87635 SARS-COV-2 COVID-19 AMP PRB: CPT | Performed by: PHYSICIAN ASSISTANT

## 2020-08-28 PROCEDURE — 77002 NEEDLE LOCALIZATION BY XRAY: CPT | Performed by: ORTHOPAEDIC SURGERY

## 2020-08-28 PROCEDURE — 77002 NEEDLE LOCALIZATION BY XRAY: CPT

## 2020-08-28 PROCEDURE — 80053 COMPREHEN METABOLIC PANEL: CPT | Performed by: PHYSICIAN ASSISTANT

## 2020-08-28 PROCEDURE — 99284 EMERGENCY DEPT VISIT MOD MDM: CPT

## 2020-08-28 PROCEDURE — 25010000003 LIDOCAINE 1 % SOLUTION: Performed by: PHYSICIAN ASSISTANT

## 2020-08-28 PROCEDURE — 93005 ELECTROCARDIOGRAM TRACING: CPT | Performed by: PHYSICIAN ASSISTANT

## 2020-08-28 PROCEDURE — 25010000002 METHYLPREDNISOLONE PER 80 MG: Performed by: PHYSICIAN ASSISTANT

## 2020-08-28 PROCEDURE — 84484 ASSAY OF TROPONIN QUANT: CPT | Performed by: PHYSICIAN ASSISTANT

## 2020-08-28 PROCEDURE — C9803 HOPD COVID-19 SPEC COLLECT: HCPCS

## 2020-08-28 PROCEDURE — 85025 COMPLETE CBC W/AUTO DIFF WBC: CPT | Performed by: PHYSICIAN ASSISTANT

## 2020-08-28 PROCEDURE — 20610 DRAIN/INJ JOINT/BURSA W/O US: CPT | Performed by: ORTHOPAEDIC SURGERY

## 2020-08-28 RX ORDER — METHYLPREDNISOLONE ACETATE 80 MG/ML
80 INJECTION, SUSPENSION INTRA-ARTICULAR; INTRALESIONAL; INTRAMUSCULAR; SOFT TISSUE ONCE
Status: COMPLETED | OUTPATIENT
Start: 2020-08-28 | End: 2020-08-28

## 2020-08-28 RX ORDER — AMLODIPINE BESYLATE 5 MG/1
5 TABLET ORAL DAILY
Qty: 14 TABLET | Refills: 0 | Status: SHIPPED | OUTPATIENT
Start: 2020-08-28 | End: 2020-09-28 | Stop reason: SDUPTHER

## 2020-08-28 RX ORDER — AMLODIPINE BESYLATE 5 MG/1
5 TABLET ORAL
Status: DISCONTINUED | OUTPATIENT
Start: 2020-08-28 | End: 2020-08-28 | Stop reason: HOSPADM

## 2020-08-28 RX ORDER — LIDOCAINE HYDROCHLORIDE 10 MG/ML
9 INJECTION, SOLUTION INFILTRATION; PERINEURAL ONCE
Status: COMPLETED | OUTPATIENT
Start: 2020-08-28 | End: 2020-08-28

## 2020-08-28 RX ORDER — SODIUM CHLORIDE 0.9 % (FLUSH) 0.9 %
10 SYRINGE (ML) INJECTION AS NEEDED
Status: DISCONTINUED | OUTPATIENT
Start: 2020-08-28 | End: 2020-08-28 | Stop reason: HOSPADM

## 2020-08-28 RX ADMIN — LIDOCAINE HYDROCHLORIDE 9 ML: 10 INJECTION, SOLUTION INFILTRATION; PERINEURAL at 14:07

## 2020-08-28 RX ADMIN — METHYLPREDNISOLONE ACETATE 80 MG: 80 INJECTION, SUSPENSION INTRA-ARTICULAR; INTRALESIONAL; INTRAMUSCULAR; SOFT TISSUE at 14:08

## 2020-08-28 RX ADMIN — AMLODIPINE BESYLATE 5 MG: 5 TABLET ORAL at 15:16

## 2020-08-29 NOTE — POST-PROCEDURE NOTE
[facility]  [address], [address]  [place] [address]  [phone]        PROCEDURE REPORT        DIAGNOSIS:  Right hip osteoarthritis, symptomatic    PROCEDURE: Right  hip injection under flouroscopy      Dominick Kumar with date of birth 1970 presents to [facility] Radiology Department today for injection therapy.        Patient presents to [facility] Radiology Department Flouroscopy Suite on 8/28/2020 for planned elective right hip injection under flouroscopy for symptomatic osteoarthritis.    Procedure:     Patient arrived appearing a little pale and diaphoretic.  He was fully alert and states he feels fine, that it was very warm and humid outside and that he has chronic dyspnea on exertion with no recent or acute changes.  After informed consent was obtained, and using ethyl chloride topical local anesthetic, the right hip was then prepped and draped with sterile technique. With an anterior hip approach, flouroscopy guidance, and care to stay lateral of the femoral artery, the hip joint was entered via a 20 gauge spinal needle.  A mixture of 80 mg methylprednisolone in one ml plus 9 ml of 1% plain Lidocaine was injected and the needle withdrawn. The procedure was well tolerated and without complication. The patient noted some relief of focal hip joint pain.      The patient upon sitting up became somewhat more pale and diaphoretic though remained fully alert with no vasovagal episode.  He stated he has diabetes and last ate 2 - 3 hours ago.  A cool towel was placed on his forehead while checking his vitals.  He was given a drink and a Nutrigrain bar and remained clinically stable and reported no chest pain, no shortness of breathe and no other acute complaints. The patient did remain stable and with baseline ambulation. His vitals were notable for markedly elevated blood pressure and stable pulse.  At 1405 his blood pressure was 194/115 and pulse 101 with oxygen  saturation 95%.  Reviewed the potential for reaction to the injection versus an issue of baseline uncontrolled hypertension.  He states he takes benzapril and a baby aspirin daily for hypertension.  He reported having a cardiac catheterization 2 years ago with reportedly stable findings.      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  He states he is planning to apply for disability.  He tells me he has seen AMA Ewing in [place] for primary care since he moved here.  He states he has an upcoming colonscopy planned in a few weeks. He has not seen a cardiologist locally.      Repeat vitals 15 minutes later at 1420 showed little improvement with blood pressure  187/108 and pulse 84.  Reviewed with patient recommended transfer from the radiology department to the emergency room for additional evaluation, testing and treatment.  He is agreeable.  Although it is not certain that the elevated blood pressure resulted from the injection, will nevertheless add to his allergies an intolerance to corticosteroid injection with reaction of hypertension as a precaution in the future.    As for the hip condition, the patient is asked to rest the joint for a few more days with only routine ambulation before resuming full regular activities. It may be painful for the first few days. Watch for fever, skin issues, increased swelling or persistent pain in the joint. Call or return to clinic if such symptoms occur, other concerns or if there is lack of improvement as anticipated.    Impression: Symptomatic right hip osteoarthritis.      Recommendations/Plan:      Treatment and patient advice as noted  here and in office visit report.  Orthopedic activities reviewed and patient expressed appreciation.  Discussion of orthopedic goals.   Risk, benefits, and merits of treatment options reviewed and questions answered.  Call or notify for any adverse effect from injection therapy.    Exercise: As tolerated.  No strenuous activity for a few days as appropriate.  Brace:  No brace was given at today's visit  Referral: Referred and sent to [facility] ER for elevated blood pressure.  Studies: No additional studies ordered.  Surgery: No surgery proposed at this visit.  Activity:  May perform usual activities as tolerated.      Patient will return to our clinic at scheduled appointment.  Patient agreeable to call or return sooner for any concerns.    ADDENDUM:  Patient evaluated in ER including EKG and labs, and treated with anti-hypertensive medication.  He was assessed as stable and discharged with referral to both Jose Raul Rivas MD of cardiology and his primary care AMA Ewing.

## 2020-08-31 RX ORDER — MULTIPLE VITAMINS W/ MINERALS TAB 9MG-400MCG
1 TAB ORAL DAILY
COMMUNITY

## 2020-09-01 ENCOUNTER — ANESTHESIA (OUTPATIENT)
Dept: GASTROENTEROLOGY | Facility: HOSPITAL | Age: 50
End: 2020-09-01

## 2020-09-01 ENCOUNTER — HOSPITAL ENCOUNTER (OUTPATIENT)
Facility: HOSPITAL | Age: 50
Setting detail: HOSPITAL OUTPATIENT SURGERY
Discharge: HOME | End: 2020-09-01
Attending: INTERNAL MEDICINE | Admitting: INTERNAL MEDICINE

## 2020-09-01 ENCOUNTER — ANESTHESIA EVENT (OUTPATIENT)
Dept: GASTROENTEROLOGY | Facility: HOSPITAL | Age: 50
End: 2020-09-01

## 2020-09-01 VITALS
DIASTOLIC BLOOD PRESSURE: 95 MMHG | HEIGHT: 76 IN | HEART RATE: 67 BPM | OXYGEN SATURATION: 95 % | TEMPERATURE: 98 F | SYSTOLIC BLOOD PRESSURE: 125 MMHG | WEIGHT: 315 LBS | BODY MASS INDEX: 38.36 KG/M2 | RESPIRATION RATE: 18 BRPM

## 2020-09-01 DIAGNOSIS — Z12.11 COLON CANCER SCREENING: ICD-10-CM

## 2020-09-01 PROCEDURE — 82962 GLUCOSE BLOOD TEST: CPT

## 2020-09-01 PROCEDURE — 25010000002 PROPOFOL 10 MG/ML EMULSION: Performed by: NURSE ANESTHETIST, CERTIFIED REGISTERED

## 2020-09-01 PROCEDURE — 45380 COLONOSCOPY AND BIOPSY: CPT | Performed by: INTERNAL MEDICINE

## 2020-09-01 RX ORDER — PROPOFOL 10 MG/ML
VIAL (ML) INTRAVENOUS AS NEEDED
Status: DISCONTINUED | OUTPATIENT
Start: 2020-09-01 | End: 2020-09-01 | Stop reason: SURG

## 2020-09-01 RX ORDER — KETAMINE HYDROCHLORIDE 50 MG/ML
INJECTION, SOLUTION, CONCENTRATE INTRAMUSCULAR; INTRAVENOUS AS NEEDED
Status: DISCONTINUED | OUTPATIENT
Start: 2020-09-01 | End: 2020-09-01 | Stop reason: SURG

## 2020-09-01 RX ORDER — MAGNESIUM HYDROXIDE 1200 MG/15ML
LIQUID ORAL AS NEEDED
Status: DISCONTINUED | OUTPATIENT
Start: 2020-09-01 | End: 2020-09-01 | Stop reason: HOSPADM

## 2020-09-01 RX ORDER — SODIUM CHLORIDE 9 MG/ML
70 INJECTION, SOLUTION INTRAVENOUS CONTINUOUS PRN
Status: DISCONTINUED | OUTPATIENT
Start: 2020-09-01 | End: 2020-09-02

## 2020-09-01 RX ORDER — LIDOCAINE HYDROCHLORIDE 20 MG/ML
INJECTION, SOLUTION INTRAVENOUS AS NEEDED
Status: DISCONTINUED | OUTPATIENT
Start: 2020-09-01 | End: 2020-09-01 | Stop reason: SURG

## 2020-09-01 RX ADMIN — LIDOCAINE HYDROCHLORIDE 60 MG: 20 INJECTION, SOLUTION INTRAVENOUS at 11:28

## 2020-09-01 RX ADMIN — KETAMINE HYDROCHLORIDE 20 MG: 50 INJECTION, SOLUTION INTRAMUSCULAR; INTRAVENOUS at 11:28

## 2020-09-01 RX ADMIN — SODIUM CHLORIDE 70 ML/HR: 9 INJECTION, SOLUTION INTRAVENOUS at 10:56

## 2020-09-01 RX ADMIN — PROPOFOL 100 MG: 10 INJECTION, EMULSION INTRAVENOUS at 11:28

## 2020-09-01 RX ADMIN — PROPOFOL 140 MCG/KG/MIN: 10 INJECTION, EMULSION INTRAVENOUS at 11:28

## 2020-09-01 NOTE — DISCHARGE INSTRUCTIONS
- Discharge patient to home (ambulatory).   - High fiber diet.   - Continue present medications.   - Avoid NSAIDS/ Diclofenac  - Await pathology results.   - Repeat colonoscopy in 10 years for screening purposes.   - Return to GI office in 4 weeks.

## 2020-09-01 NOTE — H&P
[facility]  HISTORY AND PHYSICAL    Patient Name: Dominick Kumar  : 1970  MRN: [id number]    Chief Complaint:   For screening colonoscopy    History Of Presenting Illness:      Average risk, No prior colonoscopy    Past Medical History:   Diagnosis Date   • Anxiety and depression    • Arthritis    • Diabetes (CMS/HCC)    • History of nuclear stress test ?   • Hypertension    • Kidney stones    • Meningitis    • NATALIE (obstructive sleep apnea)     CPAP   • PONV (postoperative nausea and vomiting)    • Rotator cuff tear arthropathy of right shoulder        Past Surgical History:   Procedure Laterality Date   • CARDIAC CATHETERIZATION  ?    no stents   • HIP SURGERY Right     arthroscopy- done in [place]   • KNEE ARTHROSCOPY Right    • UMBILICAL HERNIA REPAIR     • UPPER GASTROINTESTINAL ENDOSCOPY     • VASECTOMY         Social History     Socioeconomic History   • Marital status:      Spouse name: Not on file   • Number of children: Not on file   • Years of education: Not on file   • Highest education level: Not on file   Occupational History   • Occupation: applied for disability   Tobacco Use   • Smoking status: Current Every Day Smoker     Packs/day: 1.00     Years: 32.00     Pack years: 32.00     Types: Cigarettes   • Smokeless tobacco: Former User     Types: Snuff, Chew   Substance and Sexual Activity   • Alcohol use: Yes     Comment: rarely   • Drug use: Never   • Sexual activity: Defer   Social History Narrative    Left hand dominant       Family History   Problem Relation Age of Onset   • Heart disease Mother    • Pancreatic cancer Mother 76   • Diabetes Father    • Colon cancer Neg Hx        Prior to Admission Medications:  Medications Prior to Admission   Medication Sig Dispense Refill Last Dose   • albuterol sulfate  (90 Base) MCG/ACT inhaler Inhale 2 puffs Every 4 (Four) Hours As Needed for Wheezing. 1 inhaler 0 2020 at Unknown  time   • amLODIPine (NORVASC) 5 MG tablet Take 1 tablet by mouth Daily. 14 tablet 0 8/31/2020 at Unknown time   • aspirin 81 MG EC tablet Take 81 mg by mouth Daily.   8/31/2020 at Unknown time   • benazepril (LOTENSIN) 20 MG tablet Take 1 tablet by mouth Daily. 30 tablet 5 8/31/2020 at Unknown time   • bisacodyl (DULCOLAX) 5 MG EC tablet Take as directed for colon prep 4 tablet 0 8/31/2020 at Unknown time   • diclofenac (VOLTAREN) 75 MG EC tablet Take 1 tablet by mouth 2 (Two) Times a Day As Needed (pain). 60 tablet 1 8/31/2020 at Unknown time   • metFORMIN (Glucophage) 500 MG tablet Take 1 tablet by mouth once daily x 1 week, then increase to 1 tablet twice daily (Patient taking differently: Take 500 mg by mouth 2 (Two) Times a Day With Meals. Take 1 tablet by mouth once daily x 1 week, then increase to 1 tablet twice daily) 52 tablet 0 8/31/2020 at Unknown time   • methocarbamol (ROBAXIN) 750 MG tablet Take 1 tablet by mouth 2 (Two) Times a Day As Needed for Muscle Spasms. 60 tablet 1 8/31/2020 at Unknown time   • Multiple Vitamins-Minerals (MULTIVITAMIN WITH MINERALS) tablet tablet Take 1 tablet by mouth Daily.   8/31/2020 at Unknown time   • pantoprazole (PROTONIX) 20 MG EC tablet Take 1 tablet by mouth Daily. 30 tablet 5 8/31/2020 at Unknown time   • polyethylene glycol (GoLYTELY) 236 g solution Starting at 5 pm on day prior to procedure, drink 8 ounces every 30 minutes as directed 4000 mL 0 8/31/2020 at Unknown time   • traMADol (ULTRAM) 50 MG tablet Take 1 tablet by mouth 2 (Two) Times a Day As Needed for Moderate Pain . 60 tablet 1 8/31/2020 at Unknown time   • vitamin D (ERGOCALCIFEROL) 1.25 MG (27104 UT) capsule capsule Take 1 capsule by mouth 1 (One) Time Per Week. 8 capsule 0 8/31/2020 at Unknown time       Allergies:  Allergies   Allergen Reactions   • Corticosteroids Other (See Comments)     hypertension   • Erythromycin Photosensitivity     headache        Vitals: Temp:  [98 °F (36.7 °C)] 98 °F (36.7  °C)  Heart Rate:  [85] 85  Resp:  [18] 18  BP: (177)/(104) 177/104    Review Of Systems:  Constitutional:  Negative for chills, fever, and unexpected weight change.  Respiratory:  Negative for cough, chest tightness, shortness of breath, and wheezing.  Cardiovascular:  Negative for chest pain, palpitations, and leg swelling.  Gastrointestinal:  Negative for abdominal distention, abdominal pain, Nausea, vomiting.  Neurological:  Negative for Weakness, numbness, and headaches.     Physical Exam:    General Appearance:  Alert, cooperative, in no acute distress.   Lungs:   Clear to auscultation, respirations regular, even and                 unlabored.   Heart:  Regular rhythm and normal rate.   Abdomen:   Normal bowel sounds, no masses, no organomegaly. Soft, non-tender, non-distended   Neurologic: Alert and oriented x 3. Moves all four limbs equally       Plan: COLONOSCOPY (N/A)     Marta Leal MD  9/1/2020

## 2020-09-01 NOTE — ANESTHESIA PREPROCEDURE EVALUATION
Anesthesia Evaluation     Patient summary reviewed and Nursing notes reviewed   history of anesthetic complications: PONV  NPO Solid Status: > 8 hours  NPO Liquid Status: > 8 hours           Airway   Mallampati: II  TM distance: >3 FB  Neck ROM: full  Possible difficult intubation, Difficult intubation highly probable and Large neck circumference  Dental      Pulmonary    (+) asthma,shortness of breath, sleep apnea, decreased breath sounds,   Cardiovascular     PT is on anticoagulation therapy    (+) hypertension poorly controlled, ANTUNEZ, PVD,   CAD:  high risk obese, hernan, dm.      Neuro/Psych  (+) psychiatric history Depression and Anxiety,     GI/Hepatic/Renal/Endo    (+) obesity, morbid obesity,  renal disease, diabetes mellitus,     Musculoskeletal     (+) arthralgias, back pain, myalgias,   Abdominal   (+) obese,    Substance History      OB/GYN          Other   arthritis,      ROS/Med Hx Other: Pt hs of non compliance with f/u                  Anesthesia Plan    ASA 4     MAC   (Risks and benefits discussed including risk of aspiration, recall and dental damage. All patient questions answered.    Will continue with plan of care.)  intravenous induction     Anesthetic plan, all risks, benefits, and alternatives have been provided, discussed and informed consent has been obtained with: patient.

## 2020-09-01 NOTE — ANESTHESIA POSTPROCEDURE EVALUATION
Patient: Dominick Kumar    Procedure Summary     Date:  09/01/20 Room / Location:  [facility] ENDOSCOPY 2 / [facility] ENDOSCOPY    Anesthesia Start:  1123 Anesthesia Stop:      Procedure:  COLONOSCOPY (N/A Anus) Diagnosis:       Colon cancer screening      (Colon cancer screening [Z12.11])    Surgeon:  Marta Leal MD Provider:  Sascha Peña CRNA    Anesthesia Type:  MAC ASA Status:  4          Anesthesia Type: MAC    Vitals  HR 81  Sat 93  Resp 17  /85  Temp 98        Post Anesthesia Care and Evaluation    Patient location during evaluation: bedside  Patient participation: complete - patient participated  Level of consciousness: awake and alert and sleepy but conscious  Pain score: 0  Pain management: adequate  Airway patency: patent  Anesthetic complications: No anesthetic complications  PONV Status: none  Cardiovascular status: acceptable  Respiratory status: acceptable  Hydration status: acceptable

## 2020-09-02 ENCOUNTER — OFFICE VISIT (OUTPATIENT)
Dept: FAMILY MEDICINE CLINIC | Facility: CLINIC | Age: 50
End: 2020-09-02

## 2020-09-02 VITALS
HEIGHT: 76 IN | DIASTOLIC BLOOD PRESSURE: 90 MMHG | OXYGEN SATURATION: 98 % | SYSTOLIC BLOOD PRESSURE: 180 MMHG | TEMPERATURE: 97.8 F | WEIGHT: 315 LBS | BODY MASS INDEX: 38.36 KG/M2 | HEART RATE: 80 BPM

## 2020-09-02 DIAGNOSIS — M54.12 CERVICAL RADICULOPATHY: ICD-10-CM

## 2020-09-02 DIAGNOSIS — E11.9 TYPE 2 DIABETES MELLITUS WITHOUT COMPLICATION, WITHOUT LONG-TERM CURRENT USE OF INSULIN (HCC): ICD-10-CM

## 2020-09-02 DIAGNOSIS — I10 ESSENTIAL HYPERTENSION: Primary | ICD-10-CM

## 2020-09-02 DIAGNOSIS — F32.1 CURRENT MODERATE EPISODE OF MAJOR DEPRESSIVE DISORDER WITHOUT PRIOR EPISODE (HCC): ICD-10-CM

## 2020-09-02 DIAGNOSIS — M54.2 NECK PAIN: ICD-10-CM

## 2020-09-02 LAB — GLUCOSE BLDC GLUCOMTR-MCNC: 142 MG/DL (ref 70–130)

## 2020-09-02 PROCEDURE — 99214 OFFICE O/P EST MOD 30 MIN: CPT | Performed by: NURSE PRACTITIONER

## 2020-09-02 RX ORDER — TRAMADOL HYDROCHLORIDE 50 MG/1
50 TABLET ORAL 2 TIMES DAILY PRN
Qty: 60 TABLET | Refills: 1
Start: 2020-09-02 | End: 2021-05-06 | Stop reason: SDUPTHER

## 2020-09-02 RX ORDER — DULOXETIN HYDROCHLORIDE 30 MG/1
30 CAPSULE, DELAYED RELEASE ORAL DAILY
Qty: 30 CAPSULE | Refills: 1 | Status: SHIPPED | OUTPATIENT
Start: 2020-09-02 | End: 2020-09-28 | Stop reason: SDUPTHER

## 2020-09-02 NOTE — PROGRESS NOTES
"      Subjective     Chief Complaint:    Chief Complaint   Patient presents with   • Diabetes       History of Present Illness:   He had a recent right hip injection with Dr. Polk. He notes 40% improvement in pain. He did have HTN and diaphoresis and was sent to the ED after procedure. Workup was normal.   HAs HTN. Has been taking benazepril. Was started on norvasc but notes he has not picked up the prescription yet.    Has DM. Tolerating metformin.   Has been taking robaxin for muscle spasm and pain and it helps.       Review of Systems  Gen- No fevers, chills  CV- No chest pain, palpitations  Resp- No cough, dyspnea  GI- No N/V/D, abd pain  Neuro-No dizziness, headaches      I have reviewed and/or updated the patient's past medical, surgical, family, social history and problem list as appropriate.     Medications:  No current facility-administered medications for this visit.     Current Outpatient Medications:   •  DULoxetine (Cymbalta) 30 MG capsule, Take 1 capsule by mouth Daily., Disp: 30 capsule, Rfl: 1    Allergies:  Allergies   Allergen Reactions   • Corticosteroids Other (See Comments)     hypertension   • Erythromycin Photosensitivity     headache       Objective     Vital Signs:   Vitals:    09/02/20 0934   BP: 180/90   Pulse: 80   Temp: 97.8 °F (36.6 °C)   SpO2: 98%   Weight: (!) 166 kg (365 lb)   Height: 193 cm (76\")   PainSc: 0-No pain       Physical Exam:    Physical Exam   Constitutional: He is oriented to person, place, and time. He appears well-developed and well-nourished.   HENT:   Head: Normocephalic and atraumatic.   Eyes: Pupils are equal, round, and reactive to light.   Neck: Neck supple.   Cardiovascular: Normal rate, regular rhythm, normal heart sounds and intact distal pulses.   Pulmonary/Chest: Effort normal and breath sounds normal.   Abdominal: Soft. Bowel sounds are normal. He exhibits no distension. There is no tenderness.   Musculoskeletal: He exhibits no edema.   Neurological: He " is alert and oriented to person, place, and time.   Skin: Skin is warm and dry.   Psychiatric: His behavior is normal.   flat   Nursing note and vitals reviewed.      Assessment / Plan     Assessment/Plan:   Problem List Items Addressed This Visit        Cardiovascular and Mediastinum    Hypertension - Primary    Relevant Medications    benazepril (LOTENSIN) 20 MG tablet    amLODIPine (NORVASC) 5 MG tablet       Endocrine    Type 2 diabetes mellitus without complication, without long-term current use of insulin (CMS/[facility])    Relevant Medications    metFORMIN (Glucophage) 500 MG tablet    Other Relevant Orders    Ambulatory Referral to Social Work       Other    Current moderate episode of major depressive disorder without prior episode (CMS/[facility])    Relevant Medications    DULoxetine (Cymbalta) 30 MG capsule    Other Relevant Orders    Ambulatory Referral to Social Work      Other Visit Diagnoses     Cervical radiculopathy        Relevant Orders    Ambulatory Referral to Social Work        -- BP still uncontrolled, has not started norvasc. Discussed that he has to start norvasc. He is at risk for CVA. No target organ damage noted today.   -- start Cymbalta, to help with depression and pain. His clinical depression makes his overall treatment difficult. - Medication discussed, advised to go to ED for any SI/HI, encouraged counseling/CBT, encouraged clean eating and exercise  -- continue metformin. Continue diabetic diet  -- refer to social work to help with home problems  -- continue tramadol and robaxin for pain, no NSAIDs due to ulcer finding on  c scope    Follow up:  1 month    Electronically signed by AMA Ewing   09/02/2020 9:49 AM      Please note that portions of this note may have been completed with a voice recognition program. Efforts were made to edit the dictations, but occasionally words are mistranscribed.

## 2020-09-03 LAB
LAB AP CASE REPORT: NORMAL
PATH REPORT.FINAL DX SPEC: NORMAL

## 2020-09-09 DIAGNOSIS — E11.9 TYPE 2 DIABETES MELLITUS WITHOUT COMPLICATION, WITHOUT LONG-TERM CURRENT USE OF INSULIN (HCC): ICD-10-CM

## 2020-09-11 ENCOUNTER — PATIENT OUTREACH (OUTPATIENT)
Dept: CASE MANAGEMENT | Facility: OTHER | Age: 50
End: 2020-09-11
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
## 2020-09-15 ENCOUNTER — OFFICE VISIT (OUTPATIENT)
Dept: NEUROLOGY | Facility: CLINIC | Age: 50
End: 2020-09-15

## 2020-09-15 VITALS
SYSTOLIC BLOOD PRESSURE: 140 MMHG | BODY MASS INDEX: 38.36 KG/M2 | TEMPERATURE: 97.1 F | OXYGEN SATURATION: 94 % | WEIGHT: 315 LBS | HEIGHT: 76 IN | HEART RATE: 103 BPM | DIASTOLIC BLOOD PRESSURE: 80 MMHG

## 2020-09-15 DIAGNOSIS — G47.33 OBSTRUCTIVE SLEEP APNEA: Primary | ICD-10-CM

## 2020-09-15 DIAGNOSIS — E66.9 DIABETES MELLITUS TYPE 2 IN OBESE (HCC): ICD-10-CM

## 2020-09-15 DIAGNOSIS — I10 ESSENTIAL HYPERTENSION: ICD-10-CM

## 2020-09-15 DIAGNOSIS — E11.69 DIABETES MELLITUS TYPE 2 IN OBESE (HCC): ICD-10-CM

## 2020-09-15 PROCEDURE — 99203 OFFICE O/P NEW LOW 30 MIN: CPT | Performed by: NURSE PRACTITIONER

## 2020-09-15 NOTE — PROGRESS NOTES
New Sleep Patient Office Visit      Patient Name: Dominick Kumar  : 1970   MRN: [id number]     Referring Physician: Vicki Woody APRN    Chief Complaint:    Chief Complaint   Patient presents with   • Consult     patient in office to establish care for NATALIE.        History of Present Illness: Dominick Kumar is a 50 y.o. male who is here today to establish care with Sleep Medicine.  He is currently using a CPAP machine.  He uses the machine every night and says he can't sleep without it.  He was initially diagnosed with severe NATALIE in -those records are not available for review.  He moved here to [place] at the beginning of this year from [place] and needed a new sleep provider.  He is going to be using [facility].  He is using a full face mask.  He says his CPAP machine is not working properly and he would like to get a new machine.  He denies alcohol use.  Additional risk factors- BMI 43, essential hypertension, DM 2.     Dassel Score: 10    Subjective      Review of Systems:   Review of Systems   Constitutional: Positive for fatigue. Negative for chills, fever, unexpected weight gain and unexpected weight loss.   HENT: Negative for hearing loss, sore throat, swollen glands, tinnitus and trouble swallowing.    Eyes: Negative for blurred vision, double vision, photophobia and visual disturbance.   Respiratory: Positive for apnea. Negative for cough, chest tightness, shortness of breath, wheezing and stridor.    Cardiovascular: Negative for chest pain, palpitations and leg swelling.   Gastrointestinal: Negative for abdominal pain, constipation, diarrhea, nausea and GERD.   Endocrine: Negative for cold intolerance and heat intolerance.   Genitourinary: Negative for decreased libido.   Musculoskeletal: Negative for gait problem, neck pain and neck stiffness.   Skin: Negative for color change and rash.   Allergic/Immunologic: Negative for environmental allergies and food allergies.   Neurological: Negative  for dizziness, syncope, facial asymmetry, speech difficulty, weakness, light-headedness, headache, memory problem and confusion.   Psychiatric/Behavioral: Negative for agitation, behavioral problems, decreased concentration, sleep disturbance and depressed mood. The patient is not nervous/anxious.        Past Medical History:   Past Medical History:   Diagnosis Date   • Anxiety and depression    • Arthritis    • Diabetes (CMS/HCC) 2020   • History of nuclear stress test 2016?   • Hypertension    • Kidney stones    • Meningitis    • NATALIE (obstructive sleep apnea)     CPAP   • PONV (postoperative nausea and vomiting)    • Rotator cuff tear arthropathy of right shoulder 1999       Past Surgical History:   Past Surgical History:   Procedure Laterality Date   • CARDIAC CATHETERIZATION  2016?    no stents   • COLONOSCOPY N/A 9/1/2020    Procedure: COLONOSCOPY;  Surgeon: Marta Leal MD;  Location: [facility] ENDOSCOPY;  Service: Gastroenterology;  Laterality: N/A;   • HIP SURGERY Right 2000    arthroscopy- done in [place]   • KNEE ARTHROSCOPY Right 1995   • UMBILICAL HERNIA REPAIR  2019   • UPPER GASTROINTESTINAL ENDOSCOPY  1990's   • VASECTOMY         Family History:   Family History   Problem Relation Age of Onset   • Heart disease Mother    • Pancreatic cancer Mother 76   • Migraines Mother    • Diabetes Father    • Colon cancer Neg Hx        Social History:   Social History     Socioeconomic History   • Marital status:      Spouse name: Not on file   • Number of children: Not on file   • Years of education: Not on file   • Highest education level: Not on file   Occupational History   • Occupation: applied for disability   Tobacco Use   • Smoking status: Current Every Day Smoker     Packs/day: 1.00     Years: 32.00     Pack years: 32.00     Types: Cigarettes   • Smokeless tobacco: Former User     Types: Snuff, Chew   Substance and Sexual Activity   • Alcohol use: Yes     Comment: rarely   • Drug use:  "Never   • Sexual activity: Defer   Social History Narrative    Left hand dominant       Medications:     Current Outpatient Medications:   •  albuterol sulfate  (90 Base) MCG/ACT inhaler, Inhale 2 puffs Every 4 (Four) Hours As Needed for Wheezing., Disp: 1 inhaler, Rfl: 0  •  amLODIPine (NORVASC) 5 MG tablet, Take 1 tablet by mouth Daily., Disp: 14 tablet, Rfl: 0  •  aspirin 81 MG EC tablet, Take 81 mg by mouth Daily., Disp: , Rfl:   •  benazepril (LOTENSIN) 20 MG tablet, Take 1 tablet by mouth Daily., Disp: 30 tablet, Rfl: 5  •  DULoxetine (Cymbalta) 30 MG capsule, Take 1 capsule by mouth Daily., Disp: 30 capsule, Rfl: 1  •  metFORMIN (Glucophage) 500 MG tablet, Take 1 tablet by mouth 2 (Two) Times a Day With Meals. Take 1 tablet by mouth once daily x 1 week, then increase to 1 tablet twice daily, Disp: 60 tablet, Rfl: 5  •  methocarbamol (ROBAXIN) 750 MG tablet, Take 1 tablet by mouth 2 (Two) Times a Day As Needed for Muscle Spasms., Disp: 60 tablet, Rfl: 1  •  Multiple Vitamins-Minerals (MULTIVITAMIN WITH MINERALS) tablet tablet, Take 1 tablet by mouth Daily., Disp: , Rfl:   •  traMADol (ULTRAM) 50 MG tablet, Take 1 tablet by mouth 2 (Two) Times a Day As Needed for Moderate Pain ., Disp: 60 tablet, Rfl: 1    Allergies:   Allergies   Allergen Reactions   • Corticosteroids Other (See Comments)     hypertension   • Erythromycin Photosensitivity     headache       Objective     Physical Exam:  Vital Signs:   Vitals:    09/15/20 1455   BP: 140/80   Pulse: 103   Temp: 97.1 °F (36.2 °C)   SpO2: 94%   Weight: (!) 162 kg (358 lb)   Height: 193 cm (76\")   PainSc: 0-No pain     BMI: Body mass index is 43.58 kg/m².  Neck Circumference:  18 5/8    Physical Exam  Vitals signs and nursing note reviewed.   Constitutional:       General: He is not in acute distress.     Appearance: He is well-developed. He is not diaphoretic.   HENT:      Head: Normocephalic and atraumatic.      Comments: Mallampati 4  Eyes:      " Conjunctiva/sclera: Conjunctivae normal.      Pupils: Pupils are equal, round, and reactive to light.   Neck:      Musculoskeletal: Neck supple.      Thyroid: No thyroid mass or thyromegaly.      Vascular: Normal carotid pulses.      Trachea: Trachea normal.   Cardiovascular:      Rate and Rhythm: Normal rate and regular rhythm.      Heart sounds: Normal heart sounds. No murmur. No friction rub. No gallop.    Pulmonary:      Effort: Pulmonary effort is normal. No respiratory distress.      Breath sounds: Normal breath sounds. No wheezing or rales.   Musculoskeletal: Normal range of motion.   Skin:     General: Skin is warm and dry.      Findings: No rash.   Neurological:      Mental Status: He is alert and oriented to person, place, and time.   Psychiatric:         Behavior: Behavior normal.         Thought Content: Thought content normal.         Assessment / Plan      Assessment/Plan:   Dominick was seen today for consult.    Diagnoses and all orders for this visit:    Obstructive sleep apnea  - Order for PSG with CPAP.   - Printed patient education on NATALIE provided today.   - Avoid driving if drowsy.     Essential hypertension    Diabetes mellitus type 2 in obese (CMS/[facility])    BMI 40.0-44.9, adult (CMS/[facility])  - The patient was counseled on goals and the need for weight reduction. They were directed to the NIH's website on weight management, (https://www.niddk.nih.gov/health-information/weight-management/health-tips-adults) which addresses the risks of being overweight or obese, a healthy diet, tips for losing weight, and the benefit of physical activity/exercise.        Follow Up:   Return in about 1 year (around 9/15/2021) for F/U Obstructive Sleep Apnea.    I have advised the patient the need to continue the use of CPAP.  Gold standard for treatment of sleep apnea includes weight loss, use of cpap and avoidance of alcohol.  Untreated NATALIE may increase the risk for development of hypertension, stroke, myocardial  infarction, diabetes, cardiovascular disease, work-related issues and driving accidents. I have counseled and advised the patient to avoid driving or operating heavy/dangerous equipment if feeling drowsy.     AMA Baxter, FNP-C  [facility]       Please note that portions of this note may have been completed with a voice recognition program. Efforts were made to edit the dictations, but occasionally words are mistranscribed.

## 2020-09-22 ENCOUNTER — APPOINTMENT (OUTPATIENT)
Dept: NUTRITION | Facility: HOSPITAL | Age: 50
End: 2020-09-22

## 2020-09-28 ENCOUNTER — OFFICE VISIT (OUTPATIENT)
Dept: FAMILY MEDICINE CLINIC | Facility: CLINIC | Age: 50
End: 2020-09-28

## 2020-09-28 ENCOUNTER — TELEPHONE (OUTPATIENT)
Dept: FAMILY MEDICINE CLINIC | Facility: CLINIC | Age: 50
End: 2020-09-28

## 2020-09-28 VITALS
TEMPERATURE: 98.4 F | HEART RATE: 95 BPM | BODY MASS INDEX: 38.36 KG/M2 | SYSTOLIC BLOOD PRESSURE: 150 MMHG | OXYGEN SATURATION: 98 % | HEIGHT: 76 IN | DIASTOLIC BLOOD PRESSURE: 90 MMHG | WEIGHT: 315 LBS

## 2020-09-28 DIAGNOSIS — M54.12 CERVICAL RADICULOPATHY: ICD-10-CM

## 2020-09-28 DIAGNOSIS — E66.01 CLASS 3 SEVERE OBESITY WITH SERIOUS COMORBIDITY AND BODY MASS INDEX (BMI) OF 40.0 TO 44.9 IN ADULT, UNSPECIFIED OBESITY TYPE (HCC): ICD-10-CM

## 2020-09-28 DIAGNOSIS — E11.9 TYPE 2 DIABETES MELLITUS WITHOUT COMPLICATION, WITHOUT LONG-TERM CURRENT USE OF INSULIN (HCC): ICD-10-CM

## 2020-09-28 DIAGNOSIS — I10 ESSENTIAL HYPERTENSION: ICD-10-CM

## 2020-09-28 DIAGNOSIS — M48.02 CERVICAL STENOSIS OF SPINE: ICD-10-CM

## 2020-09-28 DIAGNOSIS — M54.2 NECK PAIN: ICD-10-CM

## 2020-09-28 DIAGNOSIS — F32.1 CURRENT MODERATE EPISODE OF MAJOR DEPRESSIVE DISORDER WITHOUT PRIOR EPISODE (HCC): Primary | ICD-10-CM

## 2020-09-28 DIAGNOSIS — I65.22 CALCIFICATION OF LEFT CAROTID ARTERY: ICD-10-CM

## 2020-09-28 DIAGNOSIS — M25.551 RIGHT HIP PAIN: ICD-10-CM

## 2020-09-28 PROCEDURE — 90471 IMMUNIZATION ADMIN: CPT | Performed by: NURSE PRACTITIONER

## 2020-09-28 PROCEDURE — 90686 IIV4 VACC NO PRSV 0.5 ML IM: CPT | Performed by: NURSE PRACTITIONER

## 2020-09-28 PROCEDURE — 99214 OFFICE O/P EST MOD 30 MIN: CPT | Performed by: NURSE PRACTITIONER

## 2020-09-28 RX ORDER — DULOXETIN HYDROCHLORIDE 60 MG/1
60 CAPSULE, DELAYED RELEASE ORAL DAILY
Qty: 30 CAPSULE | Refills: 5 | Status: SHIPPED | OUTPATIENT
Start: 2020-09-28 | End: 2021-05-06 | Stop reason: SDUPTHER

## 2020-09-28 RX ORDER — AMLODIPINE BESYLATE 5 MG/1
5 TABLET ORAL DAILY
Qty: 90 TABLET | Refills: 0 | Status: SHIPPED | OUTPATIENT
Start: 2020-09-28 | End: 2020-12-29 | Stop reason: SDUPTHER

## 2020-09-28 RX ORDER — METHOCARBAMOL 750 MG/1
750 TABLET, FILM COATED ORAL 2 TIMES DAILY PRN
Qty: 60 TABLET | Refills: 5 | Status: SHIPPED | OUTPATIENT
Start: 2020-09-28 | End: 2021-05-06 | Stop reason: SDUPTHER

## 2020-09-28 NOTE — PROGRESS NOTES
Subjective     Chief Complaint:  F/u HTN    History of Present Illness:   Here for f/u on HTN and mood.   Is not seeing much improvement in mood. Is on 30mg. No side effects.   Tolerating norvasc and benzapril.   Still having pain in hip and neck. Does have cervical disc stenosis. Will have sharp tingling in right hand. Neck feels stiff. Tramadol helps some. He notes he is out (he thinks). Also has been taking robaxin and it helps some as well.   Has been watching what he eats and is starting to loose some weight.   Has sleep study coming neck month.     Review of Systems  Gen- No fevers, chills  CV- No chest pain, palpitations  Resp- No cough, dyspnea  GI- No N/V/D, abd pain  Neuro-No dizziness, headaches      I have reviewed and/or updated the patient's past medical, surgical, family, social history and problem list as appropriate.     Medications:    Current Outpatient Medications:   •  albuterol sulfate  (90 Base) MCG/ACT inhaler, Inhale 2 puffs Every 4 (Four) Hours As Needed for Wheezing., Disp: 1 inhaler, Rfl: 0  •  amLODIPine (NORVASC) 5 MG tablet, Take 1 tablet by mouth Daily., Disp: 14 tablet, Rfl: 0  •  aspirin 81 MG EC tablet, Take 81 mg by mouth Daily., Disp: , Rfl:   •  benazepril (LOTENSIN) 20 MG tablet, Take 1 tablet by mouth Daily., Disp: 30 tablet, Rfl: 5  •  DULoxetine (CYMBALTA) 60 MG capsule, Take 1 capsule by mouth Daily., Disp: 30 capsule, Rfl: 5  •  metFORMIN (Glucophage) 500 MG tablet, Take 1 tablet by mouth 2 (Two) Times a Day With Meals. Take 1 tablet by mouth once daily x 1 week, then increase to 1 tablet twice daily, Disp: 60 tablet, Rfl: 5  •  methocarbamol (ROBAXIN) 750 MG tablet, Take 1 tablet by mouth 2 (Two) Times a Day As Needed for Muscle Spasms., Disp: 60 tablet, Rfl: 5  •  Multiple Vitamins-Minerals (MULTIVITAMIN WITH MINERALS) tablet tablet, Take 1 tablet by mouth Daily., Disp: , Rfl:   •  traMADol (ULTRAM) 50 MG tablet, Take 1 tablet by mouth 2 (Two) Times a Day As  "Needed for Moderate Pain ., Disp: 60 tablet, Rfl: 1    Allergies:  Allergies   Allergen Reactions   • Corticosteroids Other (See Comments)     hypertension   • Erythromycin Photosensitivity     headache       Objective     Vital Signs:   Vitals:    09/28/20 1329   BP: 150/90   Pulse: 95   Temp: 98.4 °F (36.9 °C)   SpO2: 98%   Weight: (!) 161 kg (356 lb)   Height: 193 cm (76\")   PainSc:   6   PainLoc: Hip       Physical Exam:    Physical Exam  Vitals signs and nursing note reviewed.   Constitutional:       Appearance: He is well-developed. He is obese.   HENT:      Head: Normocephalic and atraumatic.      Mouth/Throat:      Mouth: Mucous membranes are moist.   Eyes:      Pupils: Pupils are equal, round, and reactive to light.   Neck:      Musculoskeletal: Neck supple.      Vascular: No carotid bruit.   Cardiovascular:      Rate and Rhythm: Normal rate and regular rhythm.      Heart sounds: Normal heart sounds.   Pulmonary:      Effort: Pulmonary effort is normal.      Breath sounds: Normal breath sounds.   Abdominal:      General: Bowel sounds are normal. There is no distension.      Palpations: Abdomen is soft.      Tenderness: There is no abdominal tenderness.   Musculoskeletal:      Comments: Decreased ROM   Skin:     General: Skin is warm and dry.      Capillary Refill: Capillary refill takes less than 2 seconds.   Neurological:      General: No focal deficit present.      Mental Status: He is alert and oriented to person, place, and time.   Psychiatric:         Mood and Affect: Mood normal.         Behavior: Behavior normal.         Assessment / Plan     Assessment/Plan:   Problem List Items Addressed This Visit        Cardiovascular and Mediastinum    Hypertension    Relevant Medications    benazepril (LOTENSIN) 20 MG tablet    amLODIPine (NORVASC) 5 MG tablet       Digestive    Class 3 severe obesity with serious comorbidity and body mass index (BMI) of 40.0 to 44.9 in adult (CMS/[facility])       Endocrine    Type 2 " diabetes mellitus without complication, without long-term current use of insulin (CMS/[facility])    Relevant Medications    metFORMIN (Glucophage) 500 MG tablet       Other    Current moderate episode of major depressive disorder without prior episode (CMS/[facility]) - Primary    Relevant Medications    DULoxetine (CYMBALTA) 60 MG capsule      Other Visit Diagnoses     Cervical radiculopathy        Relevant Medications    methocarbamol (ROBAXIN) 750 MG tablet    Other Relevant Orders    Ambulatory Referral to Physical Therapy Evaluate and treat    MRI Cervical Spine Without Contrast    Right hip pain        Relevant Orders    Ambulatory Referral to Physical Therapy Evaluate and treat    Neck pain        Relevant Medications    methocarbamol (ROBAXIN) 750 MG tablet    Other Relevant Orders    MRI Cervical Spine Without Contrast    Cervical stenosis of spine        Relevant Orders    MRI Cervical Spine Without Contrast    Calcification of left carotid artery        Relevant Orders    Duplex Carotid Ultrasound CAR        -- orders as above  -- increase cymbalta, mood not controlled. - Medication discussed, advised to go to ED for any SI/HI, encouraged counseling/CBT, encouraged clean eating and exercise  -- continue robaxin and tramadol. Repeat MRI. MRI reviewed of 2017 with severe stenosis. I suspect this is probably worse due to decreased ROM, radiculopathy and pain.   -- check carotid duplex due to calcifications findings on xray  -- BP much better. Continue current regimen, consider increase norvasc in future  -- has lost 15 lbs this month. Congratulated on weight loss    Follow up:  6 weeks, DM, HTN, pain    Electronically signed by AMA Ewing   09/28/2020 13:38 EDT      Please note that portions of this note may have been completed with a voice recognition program. Efforts were made to edit the dictations, but occasionally words are mistranscribed.

## 2020-09-28 NOTE — TELEPHONE ENCOUNTER
Patient was confused at check out concerning the meds he is suppose to be taking. Can you please give him a call on his home #?

## 2020-09-30 DIAGNOSIS — I65.22 CALCIFICATION OF LEFT CAROTID ARTERY: Primary | ICD-10-CM

## 2020-09-30 NOTE — ADDENDUM NOTE
Addended by: JOES JOHNSON on: 9/30/2020 03:58 PM     Modules accepted: Orders    
Addended by: JOSEFINA GUALLPA on: 9/28/2020 02:12 PM     Modules accepted: Orders    
All other review of systems negative, except as noted in HPI

## 2020-10-02 ENCOUNTER — TELEPHONE (OUTPATIENT)
Dept: GASTROENTEROLOGY | Facility: CLINIC | Age: 50
End: 2020-10-02

## 2020-10-02 NOTE — TELEPHONE ENCOUNTER
Called pt to followup on the appt he missed on 09/30/2020.  He does not wish to R/S at this time as he states he received results of his colonoscopy from his PCP.

## 2020-10-06 ENCOUNTER — TELEPHONE (OUTPATIENT)
Dept: FAMILY MEDICINE CLINIC | Facility: CLINIC | Age: 50
End: 2020-10-06

## 2020-10-06 NOTE — TELEPHONE ENCOUNTER
MADDI WITH [facility] CALLED ON BEHALF OF PATIENT TO REQUEST A REFILL FOR   DICLOFENAC 75 MG.      PATIENT LAST HAD THIS FILLED IN JULY       [id number] - [place] - [address] [facility] - [phone] [facility] [phone]

## 2020-10-07 ENCOUNTER — TRANSCRIBE ORDERS (OUTPATIENT)
Dept: FAMILY MEDICINE CLINIC | Facility: CLINIC | Age: 50
End: 2020-10-07

## 2020-10-07 DIAGNOSIS — Z51.81 ENCOUNTER FOR THERAPEUTIC DRUG MONITORING: Primary | ICD-10-CM

## 2020-10-07 NOTE — TELEPHONE ENCOUNTER
He is not suppose to be taking diclofenac. He had EGD that showed gastritis and GI advised against NSAIDs for now.

## 2020-10-08 ENCOUNTER — HOSPITAL ENCOUNTER (OUTPATIENT)
Dept: SLEEP MEDICINE | Facility: HOSPITAL | Age: 50
End: 2020-10-08

## 2020-10-09 ENCOUNTER — HOSPITAL ENCOUNTER (OUTPATIENT)
Dept: NUTRITION | Facility: HOSPITAL | Age: 50
Discharge: HOME | End: 2020-10-09

## 2020-10-09 VITALS — HEIGHT: 76 IN | WEIGHT: 315 LBS | BODY MASS INDEX: 38.36 KG/M2

## 2020-10-09 NOTE — PROGRESS NOTES
Adult Outpatient Nutrition  Assessment/PES    Patient Name:  Dominick Kumar  YOB: 1970  MRN: [id number]    Assessment Date:  10/9/2020    Comments:  RD spoke with pt for initial nutrition appointment focusing on nutrition for diabetes mellitus type 2 and obesity via Telehealth. Due to contact limitation, unable to obtain signature. Pt given copies of forms, has acknowledged and agreed. His current weight is 360 lbs which he attributes to lack of activity due to arthritis in his hip. Pt notes that his medications make him drowsy and he feels tired. He denies any side effects from Metformin however. RD completed 24-hour-diet-recall which includes 3 meals and no snacks.   RD and pt discussed benefits of following a 2500 kcal meal plan with small, frequent meals and snacks throughout the day.     RD and pt reviewed carbohydrate sources and recommendations for carb intake throughout the day. Hypoglycemic and hyperglycemic MNT was discussed. Pt does not currently have a glucometer. RD offered for pt to come  a complementary meter with test strips from clinical nutrition office but he believes he is going to receive one through another provider. RD encouraged pt to reach out in the next few weeks if he does not receive one. RD to mail all education materials discussed for pt reference. Pt is not currently involved in any activity due to arthritis in his hip.     Pt hopes to manage his hip pain prior to setting nutrition and activity goals. He has RD contact information and was encouraged to reach out in the future with any questions or concerns or if he would like to schedule a follow-up. RD to close pt chart at this time. Thank you for the referral.    General Info     Row Name 10/09/20 1418       Today's Session    Person(s) attending today's session  Patient     Services Used Today?  No       General Information    How Well Do You Speak English?  very well    Do You Speak a Language Other  "Than English at Home?  no    Preferred Language  English        Physical Findings     Row Name 10/09/20 1419          Physical Findings    Overall Physical Appearance  obese         Anthropometrics     Row Name 10/09/20 1514 10/09/20 1419       Anthropometrics    Height  193 cm (76\")  193 cm (76\")    Weight  --  (!) 163 kg (360 lb)       Ideal Body Weight (IBW)    Ideal Body Weight (IBW) (kg)  93.24  93.24    % Ideal Body Weight  --  175.13       Body Mass Index (BMI)    BMI (kg/m2)  --  43.91        Nutritional Info/Activity     Row Name 10/09/20 1512       Nutritional Information    Have you had weight changes?  Yes    Describe weight changes  Lost 15 - 20 lbs focusing on avoidance of fast food and drinking more water    How many meals do you eat each day?  3    How many snacks do you eat each day?  0    Enter everything you can remember eating in the last 24 hours (1 day)  Breakfast: 2-3 eggs and toast Lunch: 2 Bologna sandwiches with mustard on whole grain bread Dinner: Tacos with a corn tortilla, ground beef and cheese; drinks 5 diet sodas daily and water          Estimated/Assessed Needs     Row Name 10/09/20 1514 10/09/20 1419       Calculation Measurements    Weight Used For Calculations  163 kg (360 lb)  --    Height  193 cm (76\")  193 cm (76\")       Estimated/Assessed Needs    Additional Documentation  Calorie Requirements (Group);Protein Requirements (Group);[place]-St. Jeor Equation (Group);Fluid Requirements (Group)  --       Calorie Requirements    Estimated Calorie Need Method  [place]  --    Estimated Calorie Requirement Comment  2500 - 2700  --       [place]-St. Jeor Equation    RMR ([place]-St. Jeor Equation)  2589.45  --       Protein Requirements    Weight Used For Protein Calculations  163 kg (360 lb)  --    Est Protein Requirement Amount (gms/kg)  1.2 gm protein 164 - 195 gm  --    Estimated Protein Requirements (gms/day)  195.95  --       Fluid Requirements    Estimated Fluid " Requirement Method  [place]-Segar Formula  --    [place]-Segar Method (over 20 kg)  4765.9  --                Problem/Interventions:  Problem 1     Row Name 10/09/20 1515          Nutrition Diagnoses Problem 1    Problem 1  Impaired Nutrient Utilization     Etiology (related to)  Medical Diagnosis     Endocrine  DM2     Signs/Symptoms (evidenced by)  Biochemical     Specific Labs Noted  HgbA1C         Problem 2     Row Name 10/09/20 1515          Nutrition Diagnoses Problem 2    Problem 2  Overweight/Obesity     Etiology (related to)  Factors Affecting Nutrition     Food Habit/Preferences  Fast Food     Signs/Symptoms (evidenced by)  BMI     BMI  Greater than 40               Intervention Goal     Row Name 10/09/20 1515          Intervention Goal    General  Meet nutritional needs for age/condition;Improved nutrition related lab(s)     PO  Meet estimated needs     Weight  Appropriate weight loss           Nutrition Prescription     Row Name 10/09/20 1516          Nutrition Prescription PO    PO Prescription  Begin/change diet     Begin/Change Diet to  Regular     Common Modifiers  Consistent Carbohydrate         Education/Evaluation     Row Name 10/09/20 1516          Education    Education  Provided education regarding     Provided education regarding  Diet rationale;Healthy eating for diabetes;Key food habit change;Nutrition for age        Monitor/Evaluation    Monitor  Per protocol;I&O;PO intake           Electronically signed by:  Leticia Lora RD  10/09/20 15:16 EDT

## 2020-10-20 ENCOUNTER — HOSPITAL ENCOUNTER (OUTPATIENT)
Dept: ULTRASOUND IMAGING | Facility: HOSPITAL | Age: 50
Discharge: HOME | End: 2020-10-20

## 2020-10-20 ENCOUNTER — HOSPITAL ENCOUNTER (OUTPATIENT)
Dept: MRI IMAGING | Facility: HOSPITAL | Age: 50
Discharge: HOME | End: 2020-10-20

## 2020-10-20 DIAGNOSIS — M54.12 CERVICAL RADICULOPATHY: ICD-10-CM

## 2020-10-20 DIAGNOSIS — I65.22 CALCIFICATION OF LEFT CAROTID ARTERY: ICD-10-CM

## 2020-10-20 DIAGNOSIS — M54.2 NECK PAIN: ICD-10-CM

## 2020-10-20 DIAGNOSIS — M48.02 CERVICAL STENOSIS OF SPINE: ICD-10-CM

## 2020-10-20 PROCEDURE — 72141 MRI NECK SPINE W/O DYE: CPT

## 2020-10-20 PROCEDURE — 93880 EXTRACRANIAL BILAT STUDY: CPT

## 2020-10-22 NOTE — PROGRESS NOTES
Neck MRI shows degenerative disc disease with bulging disks.  He has mild narrowing of his discs but no overt spinal stenosis.  I do not think at this point he is a surgical candidate however I do not mind to get him in with neurosurgery to discuss potential options.  If he is agreeable.  His carotid duplex showed mild plaque in his carotid arteries but no significant stenosis.  Think he would benefit from cholesterol medicine to help decrease inflammation in his arteries.  Please let me know if he is agreeable.

## 2020-10-27 ENCOUNTER — TREATMENT (OUTPATIENT)
Dept: PHYSICAL THERAPY | Facility: CLINIC | Age: 50
End: 2020-10-27

## 2020-10-27 DIAGNOSIS — M54.50 CHRONIC RIGHT-SIDED LOW BACK PAIN WITHOUT SCIATICA: Primary | ICD-10-CM

## 2020-10-27 DIAGNOSIS — G89.29 CHRONIC RIGHT-SIDED LOW BACK PAIN WITHOUT SCIATICA: Primary | ICD-10-CM

## 2020-10-27 PROCEDURE — 97161 PT EVAL LOW COMPLEX 20 MIN: CPT | Performed by: PHYSICAL THERAPIST

## 2020-10-27 NOTE — PROGRESS NOTES
Physical Therapy Initial Evaluation and Plan of Care      Patient: Dominick Kumar   : 1970  Diagnosis/ICD-10 Code:  No primary diagnosis found.  Referring practitioner: AMA Ewing    Subjective Evaluation    History of Present Illness  Mechanism of injury: R hip pain OA.  Surgery 25 years ago he had what seemed like a OCD injury to the hip.      Pt report hip has progressively gotten worse.  2 years ago injection helped for about 1.5 years.  Pt reports he had pain suddenly elevate.     Injection again in August with no relief and blood pressure jumped up.  The injection doesn't seem to be helping.      2017 R arm weakness numbness and tingling.      Never been to PT before.      MD did talk to MD about ROMY.  He reports that he wants to get disability and get ready to have a ROMY.        Patient Occupation: .  [employer] .  Not currently working at this time.   Pain  Current pain ratin  At best pain ratin (slouching on the couch)  At worst pain ratin  Location: R posterior hip primary area.   Quality: sharp, tight and throbbing  Relieving factors: change in position and relaxation  Aggravating factors: repetitive movement, ambulation, squatting and movement  Progression: worsening    Diagnostic Tests  X-ray: abnormal    Treatments  Treatments tried: never had PT before.            Objective          Palpation     Right   Hypertonic in the erector spinae and lumbar paraspinals. Tenderness of the erector spinae and lumbar paraspinals.     Additional Palpation Details  L3-S1 area    Secondary area R PSIS area.      Hip is only slightly tender to palpation.      Tenderness     Lumbar Spine  Tenderness in the right transverse process.     Additional Tenderness Details  R L3-S1 area primary area of pain.       Active Range of Motion     Additional Active Range of Motion Details  Trunk ROM not fully assessed due to pain guarding.      Passive Range of Motion     Additional Passive Range  of Motion Details  R hip PROM is guarded and limited by tightness and soft tissue approximation but he does NOT seem restricted from the femoral acetabular joint and does not report any groin pain.     Hip ER and IR supine seem to be tight and painful in the lumbar spine not hip joint.     Prone hip IR and ER B LE's are limited with ROM but equal and did not reproduce any hip pain.     Tests     Lumbar     Left   Negative crossed SLR (LBP only) and passive SLR (LBP only ).     Right   Negative crossed SLR (LBP only) and passive SLR (LBP only).     Additional Tests Details  Prone lying 3/10 with pain more centralized toward the spine at the L3-L5 area on the R.            Assessment & Plan     Assessment  Impairments: abnormal muscle tone, abnormal or restricted ROM, activity intolerance, lacks appropriate home exercise program and pain with function  Assessment details: Patient is a 50 year old male who comes to physical therapy with what he thinks is a R hip issue.  Upon evaluation he seems to have only lumbar spine issues at this time.  He is not even having any radicular/referred pain into the hip region at all.  His pain is in the R Lumbar spine.  His R hip PROM is somewhat WFL's as compared to his L LE. Further assessment to PREP next visit.  The patient currently has pain, decreased ROM, decreased strength, and inability to perform all essential functional activities. Pt will benefit from skilled PT services to address the above issues.     Prognosis: poor  Functional Limitations: carrying objects, lifting, pulling, pushing, uncomfortable because of pain, sitting, standing, stooping and unable to perform repetitive tasks  Goals  Plan Goals: SHORT TERM GOALS:     2 weeks  1. Pt independent with HEP  2. Pt to demonstrate trunk AROM 50-75% of expected norms to allow for improved ability to perform ADL's  3. Pt to demonstrate bilateral hip strength 4/5 in all planes to improved stability of the core/trunk     LONG  TERM GOALS:   6 weeks  1. Pt to demonstrate trunk AROM 50-75% of expected norms to allow for improved ability to perform functional activities  2. Pt to demonstrate ability to perform full functional squat with good form and without increased pain in the low back   3. Pt to report being able to work full shift or work in the home without increase in pain in the back  4. Pt to report cessation of pain/numbness/tingling into the bilateral leg to show decreased nerve compression      Plan  Therapy options: will be seen for skilled physical therapy services  Planned modality interventions: cryotherapy, thermotherapy (hydrocollator packs) and electrical stimulation/Russian stimulation  Planned therapy interventions: abdominal trunk stabilization, manual therapy, spinal/joint mobilization, soft tissue mobilization, strengthening, stretching, therapeutic activities, functional ROM exercises, flexibility, body mechanics training, neuromuscular re-education and postural training  Treatment plan discussed with: patient  Plan details: Pt will be seen 1-2x / week.  Assess Pt response to PREP, posture and body mechanics.         Manual Therapy:         mins  29694;  Therapeutic Exercise:         mins  87627;     Neuromuscular Nicanor:        mins  92772;    Therapeutic Activity:          mins  82271;     Gait Training:           mins  04202;     Ultrasound:          mins  45529;    Electrical Stimulation:         mins  32063 ( );  Dry Needling          mins self-pay    Timed Treatment:      mins   Total Treatment:     35   mins    PT SIGNATURE: Vincent Ambrocio, PT   DATE TREATMENT INITIATED: 10/27/2020    Initial Certification  Certification Period: 1/25/2021  I certify that the therapy services are furnished while this patient is under my care.  The services outlined above are required by this patient, and will be reviewed every 90 days.     PHYSICIAN: Vicki Woody APRN      DATE:     Please sign and return  via fax to  .. Thank you, [facility].

## 2020-11-06 ENCOUNTER — TELEPHONE (OUTPATIENT)
Dept: ORTHOPEDICS | Facility: OTHER | Age: 50
End: 2020-11-06

## 2020-11-09 ENCOUNTER — OFFICE VISIT (OUTPATIENT)
Dept: FAMILY MEDICINE CLINIC | Facility: CLINIC | Age: 50
End: 2020-11-09

## 2020-11-09 ENCOUNTER — TREATMENT (OUTPATIENT)
Dept: PHYSICAL THERAPY | Facility: CLINIC | Age: 50
End: 2020-11-09

## 2020-11-09 VITALS
OXYGEN SATURATION: 98 % | WEIGHT: 315 LBS | HEIGHT: 76 IN | DIASTOLIC BLOOD PRESSURE: 82 MMHG | BODY MASS INDEX: 38.36 KG/M2 | SYSTOLIC BLOOD PRESSURE: 132 MMHG | TEMPERATURE: 96.9 F | HEART RATE: 94 BPM

## 2020-11-09 DIAGNOSIS — E78.5 HYPERLIPIDEMIA, UNSPECIFIED HYPERLIPIDEMIA TYPE: ICD-10-CM

## 2020-11-09 DIAGNOSIS — I77.9 BILATERAL CAROTID ARTERY DISEASE, UNSPECIFIED TYPE (HCC): ICD-10-CM

## 2020-11-09 DIAGNOSIS — F32.1 CURRENT MODERATE EPISODE OF MAJOR DEPRESSIVE DISORDER WITHOUT PRIOR EPISODE (HCC): ICD-10-CM

## 2020-11-09 DIAGNOSIS — E11.9 TYPE 2 DIABETES MELLITUS WITHOUT COMPLICATION, WITHOUT LONG-TERM CURRENT USE OF INSULIN (HCC): Primary | ICD-10-CM

## 2020-11-09 DIAGNOSIS — G89.29 CHRONIC RIGHT-SIDED LOW BACK PAIN WITHOUT SCIATICA: Primary | ICD-10-CM

## 2020-11-09 DIAGNOSIS — M54.50 CHRONIC RIGHT-SIDED LOW BACK PAIN WITHOUT SCIATICA: Primary | ICD-10-CM

## 2020-11-09 DIAGNOSIS — M48.02 CERVICAL STENOSIS OF SPINE: ICD-10-CM

## 2020-11-09 LAB — HBA1C MFR BLD: 8.1 %

## 2020-11-09 PROCEDURE — 83036 HEMOGLOBIN GLYCOSYLATED A1C: CPT | Performed by: NURSE PRACTITIONER

## 2020-11-09 PROCEDURE — 99214 OFFICE O/P EST MOD 30 MIN: CPT | Performed by: NURSE PRACTITIONER

## 2020-11-09 PROCEDURE — 97530 THERAPEUTIC ACTIVITIES: CPT | Performed by: PHYSICAL THERAPIST

## 2020-11-09 PROCEDURE — 97110 THERAPEUTIC EXERCISES: CPT | Performed by: PHYSICAL THERAPIST

## 2020-11-09 RX ORDER — ATORVASTATIN CALCIUM 20 MG/1
20 TABLET, FILM COATED ORAL NIGHTLY
Qty: 90 TABLET | Refills: 1 | Status: SHIPPED | OUTPATIENT
Start: 2020-11-09 | End: 2021-05-06 | Stop reason: SDUPTHER

## 2020-11-09 NOTE — PROGRESS NOTES
Physical Therapy Daily Progress Note    Patient Information  Dominick Kumar  1970      Visit # : 2    Dominick Kumar reports 2/10 pain today at rest.  Low back pain and R hip pain in the back of the R hip.          Objective Pt presents to PT today with no distress at rest.     Pt is responding to the therapy and having less pain with the stretches and mobility activity.     Post PT he had some elevated pain from doing so much.      Pt seems to be very flush looking/sweating and breathing difficultly.    See Exercise, Manual, and Modality Logs for complete treatment.     Assessment/Plan  Pt is moving better and doing more exercises but he seems to be in overall poor health from more than orthopedic issues.       Progress per Plan of Care and Progress strengthening /stabilization /functional activity      Visit Diagnoses:    ICD-10-CM ICD-9-CM   1. Chronic right-sided low back pain without sciatica  M54.5 724.2    G89.29 338.29            Manual Therapy:         mins  04534;  Therapeutic Exercise:    26     mins  77142;     Neuromuscular Nicanor:        mins  58490;    Therapeutic Activity:     13     mins  54948;     Gait Training:           mins  79786;     Ultrasound:          mins  33921;    Electrical Stimulation:         mins  31097 ( );  Dry Needling          mins self-pay    Timed Treatment:   39   mins   Total Treatment:     39   mins          Vincent Ambrocio, PT  Physical Therapist

## 2020-11-09 NOTE — PROGRESS NOTES
Subjective     Chief Complaint:  F/u back pain    History of Present Illness:   Here to follow up on hip, back, and neck pain. Has been going to PT. Notes he feels he is improving. Still hurts but mobility is getting better. He is also doing his home exercises.   He is taking tramadol. Uses as needed. Notes he cannot take it with robaxin as it makes him too sleepy.   Last visit cymbalta was increased. Notes he still has some depression and stress but overall he is seeing improvement. No SI/HI    Notes excessive gas. His mom  of pancreatic cancer diagnosed in her 70's.       Review of Systems  Gen- No fevers, chills  CV- No chest pain, palpitations  Resp- No cough, dyspnea  GI- No N/V/D, abd pain  Neuro-No dizziness, headaches      I have reviewed and/or updated the patient's past medical, surgical, family, social history and problem list as appropriate.     Medications:    Current Outpatient Medications:   •  amLODIPine (NORVASC) 5 MG tablet, Take 1 tablet by mouth Daily., Disp: 90 tablet, Rfl: 0  •  aspirin 81 MG EC tablet, Take 81 mg by mouth Daily., Disp: , Rfl:   •  benazepril (LOTENSIN) 20 MG tablet, Take 1 tablet by mouth Daily., Disp: 30 tablet, Rfl: 5  •  DULoxetine (CYMBALTA) 60 MG capsule, Take 1 capsule by mouth Daily., Disp: 30 capsule, Rfl: 5  •  metFORMIN (Glucophage) 500 MG tablet, Take 1 tablet by mouth 2 (Two) Times a Day With Meals. Take 1 tablet by mouth once daily x 1 week, then increase to 1 tablet twice daily, Disp: 60 tablet, Rfl: 5  •  methocarbamol (ROBAXIN) 750 MG tablet, Take 1 tablet by mouth 2 (Two) Times a Day As Needed for Muscle Spasms., Disp: 60 tablet, Rfl: 5  •  Multiple Vitamins-Minerals (MULTIVITAMIN WITH MINERALS) tablet tablet, Take 1 tablet by mouth Daily., Disp: , Rfl:   •  traMADol (ULTRAM) 50 MG tablet, Take 1 tablet by mouth 2 (Two) Times a Day As Needed for Moderate Pain ., Disp: 60 tablet, Rfl: 1  •  atorvastatin (Lipitor) 20 MG tablet, Take 1 tablet by mouth  "Every Night., Disp: 90 tablet, Rfl: 1    Allergies:  Allergies   Allergen Reactions   • Corticosteroids Other (See Comments)     hypertension   • Erythromycin Photosensitivity     headache       Objective     Vital Signs:   Vitals:    11/09/20 1354   BP: 132/82   Pulse: 94   Temp: 96.9 °F (36.1 °C)   SpO2: 98%   Weight: (!) 162 kg (357 lb)   Height: 193 cm (76\")   PainSc:   4   PainLoc: Back       Physical Exam:    Physical Exam  Vitals signs and nursing note reviewed.   Constitutional:       Appearance: He is well-developed. He is obese.   HENT:      Head: Normocephalic and atraumatic.   Eyes:      Pupils: Pupils are equal, round, and reactive to light.   Neck:      Musculoskeletal: Neck supple.   Cardiovascular:      Rate and Rhythm: Normal rate and regular rhythm.      Heart sounds: Normal heart sounds.   Pulmonary:      Effort: Pulmonary effort is normal.      Breath sounds: Normal breath sounds.   Abdominal:      General: Bowel sounds are normal. There is no distension.      Palpations: Abdomen is soft.      Tenderness: There is no abdominal tenderness.   Skin:     General: Skin is warm and dry.   Neurological:      General: No focal deficit present.      Mental Status: He is alert and oriented to person, place, and time.   Psychiatric:         Mood and Affect: Mood normal.         Behavior: Behavior normal.         Assessment / Plan     Assessment/Plan:   Problem List Items Addressed This Visit        Endocrine    Type 2 diabetes mellitus without complication, without long-term current use of insulin (CMS/[facility]) - Primary    Relevant Medications    metFORMIN (Glucophage) 500 MG tablet    Other Relevant Orders    POC Glycosylated Hemoglobin (Hb A1C) (Completed)       Other    Current moderate episode of major depressive disorder without prior episode (CMS/[facility])    Relevant Medications    DULoxetine (CYMBALTA) 60 MG capsule    Other Relevant Orders    Ambulatory Referral to Behavioral Health      Other Visit " Diagnoses     Hyperlipidemia, unspecified hyperlipidemia type        Relevant Medications    atorvastatin (Lipitor) 20 MG tablet    Bilateral carotid artery disease, unspecified type (CMS/HCC)        Relevant Medications    atorvastatin (Lipitor) 20 MG tablet    Cervical stenosis of spine            -- continue to work on diet and exercise. Continue metformin   -- Follow diabetic diet, discussed at length, limit carbs, increase protein in moderation, increase water intake, Monitor blood sugars as discussed, See eye doctor annually or as discussed, Wear protective foot wear/no bare feet, Check feet regularly for calluses or ulcers, Discussed risk of poorly controlled diabetes and long-term complications, Exercise as tolerated up to 30 minutes 5 days a week, Take all medications as prescribed  -- referral for counseling, continue cymbalta  -- statin started for secondary prevention   -- continue PT. Continue tramadol prn. No nsaids due to egd findings  -- can consider referral for pancreatic testing in the future. Guidelines suggest starting 10 years prior to age of family member diagnosed.     Follow up:  Return in about 3 months (around 2/9/2021).    Electronically signed by AMA Ewing   11/09/2020 14:13 EST      Please note that portions of this note may have been completed with a voice recognition program. Efforts were made to edit the dictations, but occasionally words are mistranscribed.

## 2020-11-11 ENCOUNTER — TELEPHONE (OUTPATIENT)
Dept: FAMILY MEDICINE CLINIC | Facility: CLINIC | Age: 50
End: 2020-11-11

## 2020-11-11 NOTE — TELEPHONE ENCOUNTER
Tried calling pt to sched appt with Clarisse Cordoba. No ans @ cell #; LM on home # to call for scheduling.  HUB - If pt returns call, please transfer to the office for scheduling

## 2020-11-16 ENCOUNTER — TELEPHONE (OUTPATIENT)
Dept: PHYSICAL THERAPY | Facility: CLINIC | Age: 50
End: 2020-11-16

## 2020-11-16 NOTE — TELEPHONE ENCOUNTER
Patient called and needed to reschedule due to him twisting his back last night. He said he needed to rest and was sore and did not think he'd be able to do the therapy today. Still doing home exercise when possible. Rescheduled for Monday 11/30.

## 2020-11-25 ENCOUNTER — OFFICE VISIT (OUTPATIENT)
Dept: BEHAVIORAL HEALTH | Facility: CLINIC | Age: 50
End: 2020-11-25

## 2020-11-25 VITALS
WEIGHT: 315 LBS | DIASTOLIC BLOOD PRESSURE: 88 MMHG | HEART RATE: 85 BPM | HEIGHT: 74 IN | SYSTOLIC BLOOD PRESSURE: 142 MMHG | TEMPERATURE: 97.3 F | OXYGEN SATURATION: 95 % | BODY MASS INDEX: 40.43 KG/M2

## 2020-11-25 DIAGNOSIS — F43.9 TRAUMA AND STRESSOR-RELATED DISORDER: ICD-10-CM

## 2020-11-25 DIAGNOSIS — F33.1 MAJOR DEPRESSIVE DISORDER, RECURRENT, MODERATE (HCC): Primary | ICD-10-CM

## 2020-11-25 PROCEDURE — 90791 PSYCH DIAGNOSTIC EVALUATION: CPT | Performed by: COUNSELOR
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
## 2020-11-30 ENCOUNTER — TELEPHONE (OUTPATIENT)
Dept: FAMILY MEDICINE CLINIC | Facility: CLINIC | Age: 50
End: 2020-11-30

## 2020-11-30 DIAGNOSIS — E11.9 TYPE 2 DIABETES MELLITUS WITHOUT COMPLICATION, WITHOUT LONG-TERM CURRENT USE OF INSULIN (HCC): ICD-10-CM

## 2020-11-30 NOTE — TELEPHONE ENCOUNTER
PATIENT CALLED STATING THAT AT HIS LAST APPOINTMENT, IT WAS DISCUSSED TO INCREASE HIS METFORMIN TO 1000MG AND IT WAS NOT DONE. PATIENT IS WONDERING IF IT WAS SUPPOSED TO BE INCREASED OR LEFT THE SAME. PATIENT IS UNCLEAR ON HOW HIS MEDICATION IS SUPPOSED TO BE TAKEN SINCE THE DISCUSSED INCREASE. PATIENT WOULD LIKE A CALL BACK [phone] .

## 2020-12-02 ENCOUNTER — TREATMENT (OUTPATIENT)
Dept: PHYSICAL THERAPY | Facility: CLINIC | Age: 50
End: 2020-12-02

## 2020-12-02 DIAGNOSIS — M54.50 CHRONIC RIGHT-SIDED LOW BACK PAIN WITHOUT SCIATICA: Primary | ICD-10-CM

## 2020-12-02 DIAGNOSIS — G89.29 CHRONIC RIGHT-SIDED LOW BACK PAIN WITHOUT SCIATICA: Primary | ICD-10-CM

## 2020-12-02 PROCEDURE — 97530 THERAPEUTIC ACTIVITIES: CPT | Performed by: PHYSICAL THERAPIST

## 2020-12-02 PROCEDURE — 97110 THERAPEUTIC EXERCISES: CPT | Performed by: PHYSICAL THERAPIST

## 2020-12-02 NOTE — PROGRESS NOTES
Physical Therapy Daily Progress Note    Patient Information  Dominick Kumar  1970      Visit # : 3    Dominick Kumar reports 4/10 pain today at rest.  Pt with back pain R low back and center of the lumbar spine.          Objective Pt presents to PT today with less distress noted with ambulation and activity.  Pt is still guarded with transfers and ROM activity.     Pt with pain more centralized.     Pt is struggling to perform all the exercises due to fatigue and overall endurance levels.     See Exercise, Manual, and Modality Logs for complete treatment.     Assessment/Plan  Pt with overall less pain in the spine.  His overall medical condition is limited.       Progress per Plan of Care and Progress strengthening /stabilization /functional activity      Visit Diagnoses:    ICD-10-CM ICD-9-CM   1. Chronic right-sided low back pain without sciatica  M54.5 724.2    G89.29 338.29            Manual Therapy:         mins  54041;  Therapeutic Exercise:    31     mins  86191;     Neuromuscular Nicanor:        mins  89671;    Therapeutic Activity:     12     mins  31243;     Gait Training:           mins  79524;     Ultrasound:          mins  63261;    Electrical Stimulation:         mins  01641 ( );  Dry Needling          mins self-pay    Timed Treatment:   43   mins   Total Treatment:     43   mins          Vincent Ambrocio, PT  Physical Therapist

## 2020-12-09 ENCOUNTER — OFFICE VISIT (OUTPATIENT)
Dept: BEHAVIORAL HEALTH | Facility: CLINIC | Age: 50
End: 2020-12-09

## 2020-12-09 VITALS
TEMPERATURE: 97.1 F | OXYGEN SATURATION: 96 % | DIASTOLIC BLOOD PRESSURE: 80 MMHG | WEIGHT: 315 LBS | BODY MASS INDEX: 40.43 KG/M2 | SYSTOLIC BLOOD PRESSURE: 140 MMHG | HEART RATE: 78 BPM | HEIGHT: 74 IN

## 2020-12-09 DIAGNOSIS — F33.1 MAJOR DEPRESSIVE DISORDER, RECURRENT, MODERATE (HCC): Primary | ICD-10-CM

## 2020-12-09 PROCEDURE — 90837 PSYTX W PT 60 MINUTES: CPT | Performed by: COUNSELOR

## 2020-12-09 RX ORDER — PANTOPRAZOLE SODIUM 20 MG/1
20 TABLET, DELAYED RELEASE ORAL DAILY
COMMUNITY
Start: 2020-11-30 | End: 2021-03-02 | Stop reason: SDUPTHER

## 2020-12-09 NOTE — PROGRESS NOTES
Follow Up Therapy Office Visit      Date: 2020     Patient Name: Dominick Kumar  : 1970   Time In: 4:15   Time Out: 5:10    Chief Complaint:    Chief Complaint   Patient presents with   • Depression       History of Present Illness: Dominick Kumar is a 50 y.o. male who presents today for follow up. Met with Dominick at the [place] office today. He discussed that he did not get any sleep last night because his CPap mask broke. 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 He financially cannot get unemployment here because when he worked in [place] he made 40,000 one year. Discussed that he is concerned that Saturday he goes up in front of the board to get disability, and he will not get it.    Subjective      Review of Systems:   The following portions of the patient's history were reviewed and updated as appropriate: allergies, current medications, past family history, past medical history, past social history, past surgical history and problem list.    Review of Systems   Skin: Negative for color change, rash and bruise.   Neurological: Positive for headache and memory problem.   Psychiatric/Behavioral: Positive for sleep disturbance and stress. The patient is nervous/anxious.         Medications:     Current Outpatient Medications:   •  amLODIPine (NORVASC) 5 MG tablet, Take 1 tablet by mouth Daily., Disp: 90 tablet, Rfl: 0  •  aspirin 81 MG EC tablet, Take 81 mg by mouth Daily., Disp: , Rfl:   •  atorvastatin (Lipitor) 20 MG tablet, Take 1 tablet by mouth Every Night., Disp: 90 tablet, Rfl: 1  •  benazepril (LOTENSIN) 20 MG tablet, Take 1 tablet by mouth Daily., Disp: 30 tablet, Rfl: 5  •  DULoxetine (CYMBALTA) 60 MG capsule, Take 1 capsule by mouth Daily., Disp: 30 capsule, Rfl: 5  •  metFORMIN (GLUCOPHAGE) 1000 MG tablet, Take 1 tablet by mouth 2 (Two) Times a Day With  "Meals. Take 1 tablet by mouth once daily x 1 week, then increase to 1 tablet twice daily, Disp: 60 tablet, Rfl: 5  •  methocarbamol (ROBAXIN) 750 MG tablet, Take 1 tablet by mouth 2 (Two) Times a Day As Needed for Muscle Spasms., Disp: 60 tablet, Rfl: 5  •  Multiple Vitamins-Minerals (MULTIVITAMIN WITH MINERALS) tablet tablet, Take 1 tablet by mouth Daily., Disp: , Rfl:   •  pantoprazole (PROTONIX) 20 MG EC tablet, Take 20 mg by mouth Daily., Disp: , Rfl:   •  traMADol (ULTRAM) 50 MG tablet, Take 1 tablet by mouth 2 (Two) Times a Day As Needed for Moderate Pain ., Disp: 60 tablet, Rfl: 1    PHQ-9 Score:   PHQ-9 Total Score:       Objective     Physical Exam:   Blood pressure 140/80, pulse 78, temperature 97.1 °F (36.2 °C), height 188 cm (74\"), weight (!) 162 kg (358 lb 3.2 oz), SpO2 96 %. Body mass index is 45.99 kg/m².     Physical Exam  Vitals signs and nursing note reviewed.   Constitutional:       General: He is awake.      Appearance: Normal appearance. He is well-developed, well-groomed and normal weight.      Interventions: Face mask in place.      Comments: Face mask due to Covid   Musculoskeletal: Normal range of motion.   Skin:     Findings: No acne.   Neurological:      General: No focal deficit present.      Mental Status: He is alert and oriented to person, place, and time.      Motor: No tremor.      Gait: Gait is intact.   Psychiatric:         Attention and Perception: Attention normal. He is attentive. He does not perceive auditory or visual hallucinations.         Mood and Affect: Mood and affect normal.         Speech: Speech normal.         Behavior: Behavior normal. Behavior is cooperative.         Thought Content: Thought content normal.         Cognition and Memory: Cognition and memory normal.         Judgment: Judgment normal.         Patient's Support Network Includes:  friend    Prognosis: Good with Ongoing Treatment     Mental Status Exam:   Hygiene:   good  Cooperation:  Cooperative  Eye " Contact:  Good  Psychomotor Behavior:  Appropriate  Affect:  Full range  Mood: irritable  Hopelessness: Denies  Speech:  Normal  Thought Process:  Goal directed  Thought Content:  Normal  Suicidal:  None  Homicidal:  None  Hallucinations:  None  Delusion:  None  Memory:  Intact  Orientation:  Person, Place, Time and Situation  Reliability:  good  Insight:  Good  Judgement:  Good  Impulse Control:  Good  Physical/Medical Issues:  Yes Sleep apnea, issues related to having Meningitus, back issues     Assessment / Plan      Assessment/Plan:   Diagnoses and all orders for this visit:    1. Major depressive disorder, recurrent, moderate (CMS/HCC) (Primary)         1. Gave the patient an assignment to call the unemployment office in [place] to see about getting unemployment there. Also encouraged the patient to get a copy of all his MD appointments that he has had.     TREATMENT PLAN/GOALS: Continue supportive psychotherapy efforts and medications as indicated. Treatment and medication options discussed during today's visit. Patient ackowledged and verbally consented to continue with current treatment plan and was educated on the importance of compliance with treatment and follow-up appointments.     Counseled patient regarding multimodal approach with healthy nutrition, healthy sleep, regular physical activity, social activities, counseling, and medications.      Coping skills reviewed and encouraged positive framing of thoughts. No suicidal ideation or homicidal ideation at this time.      Assisted patient in processing above session content; acknowledged and normalized patient’s thoughts, feelings, and concerns.  Applied  positive coping skills and behavior management in session.    Allowed patient to freely discuss issues without interruption or judgment. Provided safe, confidential environment to facilitate the development of positive therapeutic relationship and encourage open, honest communication. Assisted patient  in identifying risk factors which would indicate the need for higher level of care including thoughts to harm self or others and/or self-harming behavior and encouraged patient to contact this office, call 911, or present to the nearest emergency room should any of these events occur. Discussed crisis intervention services and means to access.     Follow Up:   Return in about 2 weeks (around 12/23/2020) for Recheck.    MIN Murphy  This document has been electronically signed by MIN Murphy  December 9, 2020 17:28 EST    Please note that portions of this note were completed with a voice recognition program. Efforts were made to edit dictation, but occasionally words are mistranscribed.

## 2020-12-11 ENCOUNTER — TREATMENT (OUTPATIENT)
Dept: PHYSICAL THERAPY | Facility: CLINIC | Age: 50
End: 2020-12-11

## 2020-12-11 DIAGNOSIS — G89.29 CHRONIC RIGHT-SIDED LOW BACK PAIN WITHOUT SCIATICA: Primary | ICD-10-CM

## 2020-12-11 DIAGNOSIS — M54.50 CHRONIC RIGHT-SIDED LOW BACK PAIN WITHOUT SCIATICA: Primary | ICD-10-CM

## 2020-12-11 PROCEDURE — 97530 THERAPEUTIC ACTIVITIES: CPT | Performed by: PHYSICAL THERAPIST

## 2020-12-11 PROCEDURE — 97110 THERAPEUTIC EXERCISES: CPT | Performed by: PHYSICAL THERAPIST

## 2020-12-11 NOTE — PROGRESS NOTES
Physical Therapy Daily Progress Note    Patient Information  Dominick Kumar  1970      Visit # : 4    Dominick Kumar reports 4/10 pain today at rest.  Pt with lack of sleep last night due to C/pap not working well.     Pt with the area of pain the size of a grapefruit.     Objective Pt presents to PT today with moderate distress noted entering PT area.     HR entering PT area 120 BPM,      O2 saturation -  96%    BP  170/90     See Exercise, Manual, and Modality Logs for complete treatment.     Assessment/Plan  Pt with overall improvement with the back pain but it is still limiting him.  Physically, he is limited by overall tana issues at this time.  He looks very fatigued and not in good physical health.  The BP was high today so I encouraged him to contact his MD.        Progress per Plan of Care and Progress strengthening /stabilization /functional activity      Visit Diagnoses:    ICD-10-CM ICD-9-CM   1. Chronic right-sided low back pain without sciatica  M54.5 724.2    G89.29 338.29            Manual Therapy:         mins  37515;  Therapeutic Exercise:    26     mins  58540;     Neuromuscular Nicanor:        mins  62467;    Therapeutic Activity:     13     mins  36689;     Gait Training:           mins  82159;     Ultrasound:          mins  54548;    Electrical Stimulation:         mins  18665 ( );  Dry Needling          mins self-pay    Timed Treatment:   39   mins   Total Treatment:     55   mins          Vincent Ambrocio, PT  Physical Therapist

## 2020-12-18 ENCOUNTER — TREATMENT (OUTPATIENT)
Dept: PHYSICAL THERAPY | Facility: CLINIC | Age: 50
End: 2020-12-18

## 2020-12-18 DIAGNOSIS — M54.50 CHRONIC RIGHT-SIDED LOW BACK PAIN WITHOUT SCIATICA: Primary | ICD-10-CM

## 2020-12-18 DIAGNOSIS — G89.29 CHRONIC RIGHT-SIDED LOW BACK PAIN WITHOUT SCIATICA: Primary | ICD-10-CM

## 2020-12-18 PROCEDURE — 97530 THERAPEUTIC ACTIVITIES: CPT | Performed by: PHYSICAL THERAPIST

## 2020-12-18 PROCEDURE — 97110 THERAPEUTIC EXERCISES: CPT | Performed by: PHYSICAL THERAPIST

## 2020-12-18 NOTE — PROGRESS NOTES
Physical Therapy Daily Progress Note    Patient Information  Dominick Kumar  1970      Visit # : 5    Dominick Kumar reports 2/10 pain today at rest.  Pt reports he is feeling pain in the back more than the hip. Pt reports he is feeling better overall.  He was able to go to the store and walk better.     Objective Pt presents to PT today with no distress noted in sitting    Prone lying centralized pain and reduced tension in the Lumbar spine MM.     Pt with improved overall activity.  He was able to perform HEP with much less pain.     Pt able to perform exercises with less cues from PT as well.     See Exercise, Manual, and Modality Logs for complete treatment.     Assessment/Plan  Pt with improved back and hip pain.  His pain is centralized and much less.  He seems to be moving better with less shortness of breath as well.       Progress per Plan of Care and Progress strengthening /stabilization /functional activity      Visit Diagnoses:    ICD-10-CM ICD-9-CM   1. Chronic right-sided low back pain without sciatica  M54.5 724.2    G89.29 338.29            Manual Therapy:         mins  03915;  Therapeutic Exercise:    26     mins  07788;     Neuromuscular Nicanor:        mins  97244;    Therapeutic Activity:     13     mins  46625;     Gait Training:           mins  02196;     Ultrasound:          mins  50512;    Electrical Stimulation:         mins  00459 ( );  Dry Needling          mins self-pay    Timed Treatment:   39   mins   Total Treatment:     46   mins          Vincent Ambrocio, PT  Physical Therapist

## 2020-12-23 ENCOUNTER — OFFICE VISIT (OUTPATIENT)
Dept: BEHAVIORAL HEALTH | Facility: CLINIC | Age: 50
End: 2020-12-23

## 2020-12-23 VITALS
RESPIRATION RATE: 14 BRPM | HEART RATE: 87 BPM | OXYGEN SATURATION: 96 % | HEIGHT: 74 IN | BODY MASS INDEX: 45.99 KG/M2 | DIASTOLIC BLOOD PRESSURE: 82 MMHG | SYSTOLIC BLOOD PRESSURE: 140 MMHG | TEMPERATURE: 97.3 F

## 2020-12-23 DIAGNOSIS — F33.1 MAJOR DEPRESSIVE DISORDER, RECURRENT, MODERATE (HCC): Primary | ICD-10-CM

## 2020-12-23 PROCEDURE — 90832 PSYTX W PT 30 MINUTES: CPT | Performed by: COUNSELOR

## 2020-12-24 NOTE — PROGRESS NOTES
Follow Up Therapy Office Visit      Date: 2020     Patient Name: Dominick Kumar  : 1970   Time In: 515  Time Out: 541    Chief Complaint:    Chief Complaint   Patient presents with   • Depression       History of Present Illness: Dominick Kumar is a 50 y.o. male who presents today for follow up.  Dominick was seen at the [place] office today.  Dominick continues to feel depressed regarding his situation with having the lack of money.  Dominick did miss his appointment with the disability interview due to transportation issues.  Discussed with Dominick what he would be willing and able to do for employment until the possibility of him getting disability can occur.  Patient discussed his concern regarding not being able to find a job that will allow him not to be on his feet all the time.  Dominick discussed that he would love to send his son some money for [employer] but has been unable to do that.     Subjective      Review of Systems:   The following portions of the patient's history were reviewed and updated as appropriate: allergies, current medications, past family history, past medical history, past social history, past surgical history and problem list.    Review of Systems   Skin: Negative for color change, rash and bruise.   Psychiatric/Behavioral: Positive for decreased concentration, depressed mood and stress.        Medications:     Current Outpatient Medications:   •  amLODIPine (NORVASC) 5 MG tablet, Take 1 tablet by mouth Daily., Disp: 90 tablet, Rfl: 0  •  aspirin 81 MG EC tablet, Take 81 mg by mouth Daily., Disp: , Rfl:   •  atorvastatin (Lipitor) 20 MG tablet, Take 1 tablet by mouth Every Night., Disp: 90 tablet, Rfl: 1  •  benazepril (LOTENSIN) 20 MG tablet, Take 1 tablet by mouth Daily., Disp: 30 tablet, Rfl: 5  •  DULoxetine (CYMBALTA) 60 MG capsule, Take 1 capsule by mouth Daily., Disp: 30 capsule, Rfl: 5  •  metFORMIN (GLUCOPHAGE) 1000 MG tablet, Take 1 tablet by mouth 2 (Two) Times a Day With Meals.  "Take 1 tablet by mouth once daily x 1 week, then increase to 1 tablet twice daily, Disp: 60 tablet, Rfl: 5  •  methocarbamol (ROBAXIN) 750 MG tablet, Take 1 tablet by mouth 2 (Two) Times a Day As Needed for Muscle Spasms., Disp: 60 tablet, Rfl: 5  •  Multiple Vitamins-Minerals (MULTIVITAMIN WITH MINERALS) tablet tablet, Take 1 tablet by mouth Daily., Disp: , Rfl:   •  pantoprazole (PROTONIX) 20 MG EC tablet, Take 20 mg by mouth Daily., Disp: , Rfl:   •  traMADol (ULTRAM) 50 MG tablet, Take 1 tablet by mouth 2 (Two) Times a Day As Needed for Moderate Pain ., Disp: 60 tablet, Rfl: 1    PHQ-9 Score:   PHQ-9 Total Score:       Objective     Physical Exam:   Blood pressure 140/82, pulse 87, temperature 97.3 °F (36.3 °C), resp. rate 14, height 188 cm (74\"), SpO2 96 %. Body mass index is 45.99 kg/m².     Physical Exam  Vitals signs and nursing note reviewed.   Constitutional:       General: He is awake.      Appearance: Normal appearance. He is well-developed, well-groomed and normal weight.      Interventions: Face mask in place.      Comments: Face mask due to Covid   Musculoskeletal: Normal range of motion.   Skin:     Findings: No acne.   Neurological:      General: No focal deficit present.      Mental Status: He is alert and oriented to person, place, and time.      Motor: No tremor.      Gait: Gait is intact.   Psychiatric:         Attention and Perception: Attention normal. He is attentive. He does not perceive auditory or visual hallucinations.         Mood and Affect: Mood and affect normal.         Speech: Speech normal.         Behavior: Behavior normal. Behavior is cooperative.         Thought Content: Thought content normal.         Cognition and Memory: Cognition and memory normal.         Judgment: Judgment normal.         Patient's Support Network Includes:  friend    Prognosis: Fair with Ongoing Treatment     Mental Status Exam:   Hygiene:   fair  Cooperation:  Cooperative  Eye Contact:  Good  Psychomotor " Behavior:  Appropriate  Affect:  Appropriate  Mood: depressed  Hopelessness: Denies  Speech:  Normal  Thought Process:  Goal directed  Thought Content:  Normal  Suicidal:  None  Homicidal:  None  Hallucinations:  None  Delusion:  None  Memory:  Intact  Orientation:  Person, Place, Time and Situation  Reliability:  good  Insight:  Good  Judgement:  Good  Impulse Control:  Good  Physical/Medical Issues:  No      Assessment / Plan      Assessment/Plan:   Diagnoses and all orders for this visit:    1. Major depressive disorder, recurrent, moderate (CMS/HCC) (Primary)         1. Continue to revisit the idea of him getting employment regarding his disabilities.  Therapist will provide the patient with a phone number for vocational rehabilitation in the local area to find out if he would qualify.    TREATMENT PLAN/GOALS: Continue supportive psychotherapy efforts and medications as indicated. Treatment and medication options discussed during today's visit. Patient ackowledged and verbally consented to continue with current treatment plan and was educated on the importance of compliance with treatment and follow-up appointments.     Counseled patient regarding multimodal approach with healthy nutrition, healthy sleep, regular physical activity, social activities, counseling, and medications.      Coping skills reviewed and encouraged positive framing of thoughts. No suicidal ideation or homicidal ideation at this time.      Assisted patient in processing above session content; acknowledged and normalized patient’s thoughts, feelings, and concerns.  Applied  positive coping skills and behavior management in session.    Allowed patient to freely discuss issues without interruption or judgment. Provided safe, confidential environment to facilitate the development of positive therapeutic relationship and encourage open, honest communication. Assisted patient in identifying risk factors which would indicate the need for higher  level of care including thoughts to harm self or others and/or self-harming behavior and encouraged patient to contact this office, call 911, or present to the nearest emergency room should any of these events occur. Discussed crisis intervention services and means to access.     Follow Up:   Return in about 2 weeks (around 1/6/2021) for Recheck.    MIN Murphy  This document has been electronically signed by MIN Murphy  December 24, 2020 10:43 EST    Please note that portions of this note were completed with a voice recognition program. Efforts were made to edit dictation, but occasionally words are mistranscribed.

## 2020-12-29 RX ORDER — AMLODIPINE BESYLATE 5 MG/1
5 TABLET ORAL DAILY
Qty: 90 TABLET | Refills: 0 | Status: SHIPPED | OUTPATIENT
Start: 2020-12-29 | End: 2021-05-06 | Stop reason: SDUPTHER

## 2021-03-02 RX ORDER — PANTOPRAZOLE SODIUM 20 MG/1
20 TABLET, DELAYED RELEASE ORAL DAILY
Qty: 90 TABLET | Refills: 1 | Status: SHIPPED | OUTPATIENT
Start: 2021-03-02 | End: 2021-05-06 | Stop reason: SDUPTHER

## 2021-04-03 ENCOUNTER — HOSPITAL ENCOUNTER (EMERGENCY)
Facility: HOSPITAL | Age: 51
Discharge: HOME | End: 2021-04-04
Attending: STUDENT IN AN ORGANIZED HEALTH CARE EDUCATION/TRAINING PROGRAM | Admitting: STUDENT IN AN ORGANIZED HEALTH CARE EDUCATION/TRAINING PROGRAM

## 2021-04-03 DIAGNOSIS — T85.848A DENTAL IMPLANT PAIN, INITIAL ENCOUNTER: Primary | ICD-10-CM

## 2021-04-03 PROCEDURE — 99283 EMERGENCY DEPT VISIT LOW MDM: CPT

## 2021-04-03 RX ORDER — PENICILLIN V POTASSIUM 250 MG/1
500 TABLET ORAL ONCE
Status: COMPLETED | OUTPATIENT
Start: 2021-04-03 | End: 2021-04-04

## 2021-04-03 RX ORDER — HYDROCODONE BITARTRATE AND ACETAMINOPHEN 5; 325 MG/1; MG/1
1 TABLET ORAL ONCE
Status: COMPLETED | OUTPATIENT
Start: 2021-04-03 | End: 2021-04-04

## 2021-04-03 RX ORDER — PENICILLIN V POTASSIUM 500 MG/1
500 TABLET ORAL 4 TIMES DAILY
Qty: 40 TABLET | Refills: 0 | Status: SHIPPED | OUTPATIENT
Start: 2021-04-03 | End: 2021-04-13

## 2021-04-03 RX ORDER — IBUPROFEN 800 MG/1
800 TABLET ORAL ONCE
Status: COMPLETED | OUTPATIENT
Start: 2021-04-03 | End: 2021-04-04

## 2021-04-03 RX ORDER — ETODOLAC 200 MG/1
200 CAPSULE ORAL EVERY 8 HOURS
Qty: 15 CAPSULE | Refills: 0 | Status: SHIPPED | OUTPATIENT
Start: 2021-04-03

## 2021-04-03 RX ORDER — LIDOCAINE HYDROCHLORIDE 20 MG/ML
10 SOLUTION OROPHARYNGEAL
Status: DISCONTINUED | OUTPATIENT
Start: 2021-04-03 | End: 2021-04-04 | Stop reason: HOSPADM

## 2021-04-03 RX ORDER — ONDANSETRON 4 MG/1
4 TABLET, ORALLY DISINTEGRATING ORAL EVERY 6 HOURS PRN
Qty: 20 TABLET | Refills: 0 | Status: SHIPPED | OUTPATIENT
Start: 2021-04-03 | End: 2021-11-05

## 2021-04-04 VITALS
HEIGHT: 76 IN | RESPIRATION RATE: 18 BRPM | WEIGHT: 315 LBS | DIASTOLIC BLOOD PRESSURE: 110 MMHG | TEMPERATURE: 98.1 F | HEART RATE: 110 BPM | SYSTOLIC BLOOD PRESSURE: 190 MMHG | BODY MASS INDEX: 38.36 KG/M2 | OXYGEN SATURATION: 95 %

## 2021-04-04 RX ADMIN — IBUPROFEN 800 MG: 800 TABLET, FILM COATED ORAL at 00:07

## 2021-04-04 RX ADMIN — LIDOCAINE HYDROCHLORIDE 10 ML: 20 SOLUTION ORAL; TOPICAL at 00:10

## 2021-04-04 RX ADMIN — PENICILLIN V POTASSIUM 500 MG: 250 TABLET, FILM COATED ORAL at 00:07

## 2021-04-04 RX ADMIN — HYDROCODONE BITARTRATE AND ACETAMINOPHEN 1 TABLET: 5; 325 TABLET ORAL at 00:07

## 2021-04-04 RX ADMIN — TOPICAL ANESTHETIC 1 SPRAY: 200 SPRAY DENTAL; PERIODONTAL at 00:10

## 2021-04-04 NOTE — ED PROVIDER NOTES
Subjective   Chief Complaint: Dental pain  History of Present Illness: Right lower dental pain for the past several days.  He states he has a history of poor dentition and cannot get into his dentist until Monday.  No fever or chills.  Has been using some Orajel at home with some relief.  Onset: 2 days ago  Timing: Onset and  Exacerbating / Alleviating factors: Better with Orajel, liquids either hot or cold do not make the symptoms worse.  Associated symptoms: No fever, chills, difficulty swallowing  Character: Constant aching    Nurses Notes reviewed and agree, including vitals, allergies, social history and prior medical history.          Review of Systems   Constitutional: Negative.    HENT: Positive for dental problem.    Eyes: Negative.    Respiratory: Negative.    Cardiovascular: Negative.    Gastrointestinal: Negative.    Genitourinary: Negative.    Musculoskeletal: Negative.    Skin: Negative.    Allergic/Immunologic: Negative.    Neurological: Negative.    Psychiatric/Behavioral: Negative.    All other systems reviewed and are negative.      Past Medical History:   Diagnosis Date   • Anxiety and depression    • Arthritis    • Diabetes (CMS/HCC) 2020   • History of nuclear stress test 2016?   • Hypertension    • Kidney stones    • Meningitis    • NATALIE (obstructive sleep apnea)     CPAP   • PONV (postoperative nausea and vomiting)    • Rotator cuff tear arthropathy of right shoulder 1999       Allergies   Allergen Reactions   • Corticosteroids Other (See Comments)     hypertension   • Erythromycin Photosensitivity     headache       Past Surgical History:   Procedure Laterality Date   • CARDIAC CATHETERIZATION  2016?    no stents   • COLONOSCOPY N/A 9/1/2020    Procedure: COLONOSCOPY;  Surgeon: Marta Leal MD;  Location: [facility] ENDOSCOPY;  Service: Gastroenterology;  Laterality: N/A;   • HIP SURGERY Right 2000    arthroscopy- done in [place]   • KNEE ARTHROSCOPY Right 1995   • UMBILICAL HERNIA  REPAIR  2019   • UPPER GASTROINTESTINAL ENDOSCOPY  1990's   • VASECTOMY         Family History   Problem Relation Age of Onset   • Heart disease Mother    • Pancreatic cancer Mother 76   • Migraines Mother    • Diabetes Father    • Colon cancer Neg Hx        Social History     Socioeconomic History   • Marital status:      Spouse name: Not on file   • Number of children: Not on file   • Years of education: Not on file   • Highest education level: Not on file   Tobacco Use   • Smoking status: Current Every Day Smoker     Packs/day: 1.00     Years: 32.00     Pack years: 32.00     Types: Cigarettes   • Smokeless tobacco: Former User     Types: Snuff, Chew   Substance and Sexual Activity   • Alcohol use: Yes     Comment: rarely   • Drug use: Never   • Sexual activity: Defer           Objective   Physical Exam  Vitals and nursing note reviewed.   Constitutional:       General: He is not in acute distress.     Appearance: Normal appearance. He is obese. He is not ill-appearing, toxic-appearing or diaphoretic.      Comments: Disheveled   HENT:      Head: Normocephalic and atraumatic.      Nose: Nose normal.      Mouth/Throat:      Mouth: Mucous membranes are moist.      Pharynx: Oropharynx is clear.      Comments: Poor dentition diffusely, numerous fractured and missing teeth.  Gingival swelling to the right lower mandibular gingiva.  No discrete abscess.  No facial swelling    Eyes:      Extraocular Movements: Extraocular movements intact.   Cardiovascular:      Rate and Rhythm: Regular rhythm. Tachycardia present.   Pulmonary:      Effort: Pulmonary effort is normal.   Musculoskeletal:         General: Normal range of motion.      Cervical back: Normal range of motion.   Skin:     General: Skin is warm and dry.   Neurological:      General: No focal deficit present.      Mental Status: He is alert.   Psychiatric:         Mood and Affect: Mood normal.         Behavior: Behavior normal.         Procedures            ED Course           Noted tachycardia and hypertension although suspect this is likely due to patient's discomfort.  Afebrile nontoxic in appearance.                                MDM    Final diagnoses:   Dental implant pain, initial encounter       ED Disposition  ED Disposition     ED Disposition Condition Comment    Discharge Stable           Your dentist    Schedule an appointment as soon as possible for a visit            Medication List      New Prescriptions    etodolac 200 MG capsule  Commonly known as: LODINE  Take 1 capsule by mouth Every 8 (Eight) Hours.     ondansetron ODT 4 MG disintegrating tablet  Commonly known as: ZOFRAN-ODT  Place 1 tablet on the tongue Every 6 (Six) Hours As Needed for Nausea or Vomiting.     penicillin v potassium 500 MG tablet  Commonly known as: VEETID  Take 1 tablet by mouth 4 (Four) Times a Day for 10 days.           Where to Get Your Medications      These medications were sent to [id number] - [place] - [address] AT [facility] [address] - [phone]  - [phone] FX  [address], [place] [address]    Phone: [phone]   · etodolac 200 MG capsule  · ondansetron ODT 4 MG disintegrating tablet  · penicillin v potassium 500 MG tablet          Fareed Alcazar PA-C  04/03/21 [id number]

## 2021-05-06 DIAGNOSIS — E11.9 TYPE 2 DIABETES MELLITUS WITHOUT COMPLICATION, WITHOUT LONG-TERM CURRENT USE OF INSULIN (HCC): ICD-10-CM

## 2021-05-06 DIAGNOSIS — E78.5 HYPERLIPIDEMIA, UNSPECIFIED HYPERLIPIDEMIA TYPE: ICD-10-CM

## 2021-05-06 DIAGNOSIS — M54.2 NECK PAIN: ICD-10-CM

## 2021-05-06 DIAGNOSIS — I10 ESSENTIAL HYPERTENSION: ICD-10-CM

## 2021-05-06 DIAGNOSIS — I77.9 BILATERAL CAROTID ARTERY DISEASE, UNSPECIFIED TYPE (HCC): ICD-10-CM

## 2021-05-06 DIAGNOSIS — M54.12 CERVICAL RADICULOPATHY: ICD-10-CM

## 2021-05-06 DIAGNOSIS — F32.1 CURRENT MODERATE EPISODE OF MAJOR DEPRESSIVE DISORDER WITHOUT PRIOR EPISODE (HCC): ICD-10-CM

## 2021-05-06 NOTE — TELEPHONE ENCOUNTER
Caller: Dominick Kumar    Relationship: Self    Best call back number: [phone]     Medication needed:   Requested Prescriptions     Pending Prescriptions Disp Refills   • amLODIPine (NORVASC) 5 MG tablet 90 tablet 0     Sig: Take 1 tablet by mouth Daily.   • atorvastatin (Lipitor) 20 MG tablet 90 tablet 1     Sig: Take 1 tablet by mouth Every Night.   • pantoprazole (PROTONIX) 20 MG EC tablet 90 tablet 1     Sig: Take 1 tablet by mouth Daily.   • DULoxetine (CYMBALTA) 60 MG capsule 30 capsule 5     Sig: Take 1 capsule by mouth Daily.   • metFORMIN (GLUCOPHAGE) 1000 MG tablet 60 tablet 5     Sig: Take 1 tablet by mouth 2 (Two) Times a Day With Meals. Take 1 tablet by mouth once daily x 1 week, then increase to 1 tablet twice daily   • methocarbamol (ROBAXIN) 750 MG tablet 60 tablet 5     Sig: Take 1 tablet by mouth 2 (Two) Times a Day As Needed for Muscle Spasms.   • benazepril (LOTENSIN) 20 MG tablet 30 tablet 5     Sig: Take 1 tablet by mouth Daily.   • traMADol (ULTRAM) 50 MG tablet 60 tablet 1     Sig: Take 1 tablet by mouth 2 (Two) Times a Day As Needed for Moderate Pain .       When do you need the refill by: TODAY    What additional details did the patient provide when requesting the medication: PATIENT IS OUT OF ALL MEDICATIONS EXCEPT THE METFORMIN.    Does the patient have less than a 3 day supply:  [x] Yes  [] No    What is the patient's preferred pharmacy: [id number] [address] [facility] [address] [facility] - [phone] [facility] [phone]

## 2021-05-07 RX ORDER — PANTOPRAZOLE SODIUM 20 MG/1
20 TABLET, DELAYED RELEASE ORAL DAILY
Qty: 90 TABLET | Refills: 1 | Status: SHIPPED | OUTPATIENT
Start: 2021-05-07

## 2021-05-07 RX ORDER — DULOXETIN HYDROCHLORIDE 60 MG/1
60 CAPSULE, DELAYED RELEASE ORAL DAILY
Qty: 30 CAPSULE | Refills: 5 | Status: SHIPPED | OUTPATIENT
Start: 2021-05-07

## 2021-05-07 RX ORDER — AMLODIPINE BESYLATE 5 MG/1
5 TABLET ORAL DAILY
Qty: 90 TABLET | Refills: 0 | Status: SHIPPED | OUTPATIENT
Start: 2021-05-07 | End: 2021-05-21

## 2021-05-07 RX ORDER — METHOCARBAMOL 750 MG/1
750 TABLET, FILM COATED ORAL 2 TIMES DAILY PRN
Qty: 60 TABLET | Refills: 5 | Status: SHIPPED | OUTPATIENT
Start: 2021-05-07

## 2021-05-07 RX ORDER — TRAMADOL HYDROCHLORIDE 50 MG/1
50 TABLET ORAL 2 TIMES DAILY PRN
Qty: 60 TABLET | Refills: 1
Start: 2021-05-07 | End: 2021-08-04 | Stop reason: SDUPTHER

## 2021-05-07 RX ORDER — ATORVASTATIN CALCIUM 20 MG/1
20 TABLET, FILM COATED ORAL NIGHTLY
Qty: 90 TABLET | Refills: 1 | Status: SHIPPED | OUTPATIENT
Start: 2021-05-07 | End: 2021-11-08

## 2021-05-07 RX ORDER — BENAZEPRIL HYDROCHLORIDE 20 MG/1
20 TABLET ORAL DAILY
Qty: 30 TABLET | Refills: 5 | Status: SHIPPED | OUTPATIENT
Start: 2021-05-07

## 2021-05-21 ENCOUNTER — OFFICE VISIT (OUTPATIENT)
Dept: FAMILY MEDICINE CLINIC | Facility: CLINIC | Age: 51
End: 2021-05-21

## 2021-05-21 VITALS
OXYGEN SATURATION: 98 % | SYSTOLIC BLOOD PRESSURE: 170 MMHG | DIASTOLIC BLOOD PRESSURE: 100 MMHG | HEIGHT: 76 IN | BODY MASS INDEX: 38.36 KG/M2 | WEIGHT: 315 LBS | TEMPERATURE: 97.4 F | HEART RATE: 102 BPM

## 2021-05-21 DIAGNOSIS — E78.5 HYPERLIPIDEMIA, UNSPECIFIED HYPERLIPIDEMIA TYPE: ICD-10-CM

## 2021-05-21 DIAGNOSIS — Z87.39 HISTORY OF ROTATOR CUFF TEAR: ICD-10-CM

## 2021-05-21 DIAGNOSIS — I10 ESSENTIAL HYPERTENSION: ICD-10-CM

## 2021-05-21 DIAGNOSIS — M25.511 ACUTE PAIN OF RIGHT SHOULDER: ICD-10-CM

## 2021-05-21 DIAGNOSIS — E11.9 TYPE 2 DIABETES MELLITUS WITHOUT COMPLICATION, WITHOUT LONG-TERM CURRENT USE OF INSULIN (HCC): Primary | ICD-10-CM

## 2021-05-21 DIAGNOSIS — G47.33 OSA (OBSTRUCTIVE SLEEP APNEA): ICD-10-CM

## 2021-05-21 DIAGNOSIS — F32.1 CURRENT MODERATE EPISODE OF MAJOR DEPRESSIVE DISORDER WITHOUT PRIOR EPISODE (HCC): ICD-10-CM

## 2021-05-21 LAB — HBA1C MFR BLD: 9 %

## 2021-05-21 PROCEDURE — 99214 OFFICE O/P EST MOD 30 MIN: CPT | Performed by: NURSE PRACTITIONER

## 2021-05-21 PROCEDURE — 83036 HEMOGLOBIN GLYCOSYLATED A1C: CPT | Performed by: NURSE PRACTITIONER

## 2021-05-21 RX ORDER — GLIPIZIDE 5 MG/1
5 TABLET ORAL
Qty: 60 TABLET | Refills: 2 | Status: SHIPPED | OUTPATIENT
Start: 2021-05-21 | End: 2021-08-04

## 2021-05-21 RX ORDER — AMLODIPINE BESYLATE 10 MG/1
10 TABLET ORAL DAILY
Qty: 90 TABLET | Refills: 1 | Status: SHIPPED | OUTPATIENT
Start: 2021-05-21 | End: 2022-01-13

## 2021-06-04 ENCOUNTER — OFFICE VISIT (OUTPATIENT)
Dept: FAMILY MEDICINE CLINIC | Facility: CLINIC | Age: 51
End: 2021-06-04

## 2021-06-04 VITALS
WEIGHT: 315 LBS | DIASTOLIC BLOOD PRESSURE: 90 MMHG | HEIGHT: 76 IN | HEART RATE: 100 BPM | TEMPERATURE: 98.9 F | BODY MASS INDEX: 38.36 KG/M2 | OXYGEN SATURATION: 96 % | SYSTOLIC BLOOD PRESSURE: 140 MMHG

## 2021-06-04 DIAGNOSIS — G47.33 OSA (OBSTRUCTIVE SLEEP APNEA): ICD-10-CM

## 2021-06-04 DIAGNOSIS — I10 ESSENTIAL HYPERTENSION: ICD-10-CM

## 2021-06-04 DIAGNOSIS — F32.1 CURRENT MODERATE EPISODE OF MAJOR DEPRESSIVE DISORDER WITHOUT PRIOR EPISODE (HCC): ICD-10-CM

## 2021-06-04 DIAGNOSIS — E11.9 TYPE 2 DIABETES MELLITUS WITHOUT COMPLICATION, WITHOUT LONG-TERM CURRENT USE OF INSULIN (HCC): Primary | ICD-10-CM

## 2021-06-04 DIAGNOSIS — E78.5 HYPERLIPIDEMIA, UNSPECIFIED HYPERLIPIDEMIA TYPE: ICD-10-CM

## 2021-06-04 LAB — GLUCOSE BLDC GLUCOMTR-MCNC: 242 MG/DL (ref 70–130)

## 2021-06-04 PROCEDURE — 99214 OFFICE O/P EST MOD 30 MIN: CPT | Performed by: NURSE PRACTITIONER

## 2021-06-04 NOTE — PROGRESS NOTES
Subjective     Chief Complaint:    Chief Complaint   Patient presents with   • Follow-up     HTN; also requesting referral for Sleep Study       History of Present Illness:   Here to f/u on BP. He was out of meds for awhile due to insurance issues. Everything was restarted 2 weeks ago.     Notes last week he had stomach bug and was throwing up and having diarrhea. He was unable to take his meds during that time. He has since restarted. He takes benazeprl and norvasc for HTN.   He is taking metformin BID for diabetes. He was started on glipizide as well.   He has felt a little shakey and dizzy at times.  This was around when he was sick.  None since  He takes statin for HLD.   MRI is schedule is scheduled for his shoulder. Voltaren gel is not helping much. He takes tramadol and lodine. Does not help much.  He is still not reached out to orthopedics.  He has refractory depression. Takes cymbalta. No SI/HI.   He missed appointment for sleep study.  He states he has tried to reach out to them a few times and has been unsuccessful to reschedule    Review of Systems  Gen- No fevers, chills  CV- No chest pain, palpitations  Resp- No cough, dyspnea  GI- No N/V/D, abd pain  Neuro-No dizziness, headaches      I have reviewed and/or updated the patient's past medical, surgical, family, social history and problem list as appropriate.     Medications:    Current Outpatient Medications:   •  amLODIPine (NORVASC) 10 MG tablet, Take 1 tablet by mouth Daily., Disp: 90 tablet, Rfl: 1  •  aspirin 81 MG EC tablet, Take 81 mg by mouth Daily., Disp: , Rfl:   •  atorvastatin (Lipitor) 20 MG tablet, Take 1 tablet by mouth Every Night., Disp: 90 tablet, Rfl: 1  •  benazepril (LOTENSIN) 20 MG tablet, Take 1 tablet by mouth Daily., Disp: 30 tablet, Rfl: 5  •  Diclofenac Sodium (VOLTAREN) 1 % gel gel, Apply 4 g topically to the appropriate area as directed 4 (Four) Times a Day As Needed (shoulder pain)., Disp: 100 g, Rfl: 5  •   "DULoxetine (CYMBALTA) 60 MG capsule, Take 1 capsule by mouth Daily., Disp: 30 capsule, Rfl: 5  •  etodolac (LODINE) 200 MG capsule, Take 1 capsule by mouth Every 8 (Eight) Hours., Disp: 15 capsule, Rfl: 0  •  glipizide (Glucotrol) 5 MG tablet, Take 1 tablet by mouth 2 (Two) Times a Day Before Meals., Disp: 60 tablet, Rfl: 2  •  metFORMIN (GLUCOPHAGE) 1000 MG tablet, Take 1 tablet by mouth 2 (Two) Times a Day With Meals. Take 1 tablet by mouth once daily x 1 week, then increase to 1 tablet twice daily, Disp: 60 tablet, Rfl: 5  •  methocarbamol (ROBAXIN) 750 MG tablet, Take 1 tablet by mouth 2 (Two) Times a Day As Needed for Muscle Spasms., Disp: 60 tablet, Rfl: 5  •  Multiple Vitamins-Minerals (MULTIVITAMIN WITH MINERALS) tablet tablet, Take 1 tablet by mouth Daily., Disp: , Rfl:   •  ondansetron ODT (ZOFRAN-ODT) 4 MG disintegrating tablet, Place 1 tablet on the tongue Every 6 (Six) Hours As Needed for Nausea or Vomiting., Disp: 20 tablet, Rfl: 0  •  pantoprazole (PROTONIX) 20 MG EC tablet, Take 1 tablet by mouth Daily., Disp: 90 tablet, Rfl: 1  •  traMADol (ULTRAM) 50 MG tablet, Take 1 tablet by mouth 2 (Two) Times a Day As Needed for Moderate Pain ., Disp: 60 tablet, Rfl: 1    Allergies:  Allergies   Allergen Reactions   • Corticosteroids Other (See Comments)     hypertension   • Erythromycin Photosensitivity     headache       Objective     Vital Signs:   Vitals:    06/04/21 1638   BP: 140/90   Pulse: 100   Temp: 98.9 °F (37.2 °C)   SpO2: 96%   Weight: (!) 155 kg (341 lb)   Height: 193 cm (76\")       Physical Exam:    Physical Exam  Vitals and nursing note reviewed.   Constitutional:       Appearance: Normal appearance. He is well-developed.   HENT:      Head: Normocephalic and atraumatic.   Eyes:      Pupils: Pupils are equal, round, and reactive to light.   Cardiovascular:      Rate and Rhythm: Normal rate and regular rhythm.      Heart sounds: Normal heart sounds.   Pulmonary:      Effort: Pulmonary effort is " normal.      Breath sounds: Normal breath sounds.   Abdominal:      General: Bowel sounds are normal. There is no distension.      Palpations: Abdomen is soft.      Tenderness: There is no abdominal tenderness.   Musculoskeletal:      Cervical back: Neck supple.   Skin:     General: Skin is warm and dry.      Capillary Refill: Capillary refill takes less than 2 seconds.   Neurological:      General: No focal deficit present.      Mental Status: He is alert and oriented to person, place, and time.   Psychiatric:         Mood and Affect: Mood normal.         Behavior: Behavior normal.         Assessment / Plan     Assessment/Plan:   Problem List Items Addressed This Visit        Cardiac and Vasculature    Hypertension    Relevant Medications    benazepril (LOTENSIN) 20 MG tablet    amLODIPine (NORVASC) 10 MG tablet       Endocrine and Metabolic    Type 2 diabetes mellitus without complication, without long-term current use of insulin (CMS/[facility]) - Primary    Relevant Medications    metFORMIN (GLUCOPHAGE) 1000 MG tablet    glipizide (Glucotrol) 5 MG tablet    Other Relevant Orders    POC Glucose Fingerstick (Completed)       Mental Health    Current moderate episode of major depressive disorder without prior episode (CMS/[facility])    Relevant Medications    DULoxetine (CYMBALTA) 60 MG capsule       Sleep    NATALIE (obstructive sleep apnea)    Relevant Orders    Ambulatory Referral to Sleep Medicine      Other Visit Diagnoses     Hyperlipidemia, unspecified hyperlipidemia type            --Blood pressure much better today.  Continue amlodipine, Benzapril  --Random glucose 242 today.  Continue current dose of metformin and glipizide.  Continue to work on diabetic diet.   --Continue Cymbalta for mood.  He does have refractory depression.  We have discussed counseling before he declines at this time  --Continue statin  --We will reach out to sleep medicine to see if he needs another office visit or if he can have his sleep study  repeated  --I encouraged him to call orthopedics to make an appointment to be seen concerning his shoulder and hip pain.  Await MRI    Follow up:  Return in about 2 months (around 8/4/2021).    Electronically signed by AMA Ewing   06/04/2021 17:00 EDT      Please note that portions of this note may have been completed with a voice recognition program. Efforts were made to edit the dictations, but occasionally words are mistranscribed.

## 2021-06-23 ENCOUNTER — TELEPHONE (OUTPATIENT)
Dept: FAMILY MEDICINE CLINIC | Facility: CLINIC | Age: 51
End: 2021-06-23

## 2021-06-23 ENCOUNTER — HOSPITAL ENCOUNTER (OUTPATIENT)
Dept: MRI IMAGING | Facility: HOSPITAL | Age: 51
Discharge: HOME | End: 2021-06-23
Admitting: NURSE PRACTITIONER

## 2021-06-23 DIAGNOSIS — Z87.39 HISTORY OF ROTATOR CUFF TEAR: ICD-10-CM

## 2021-06-23 DIAGNOSIS — R93.6 ABNORMAL MRI, SHOULDER: Primary | ICD-10-CM

## 2021-06-23 DIAGNOSIS — M25.511 ACUTE PAIN OF RIGHT SHOULDER: ICD-10-CM

## 2021-06-23 PROCEDURE — 73221 MRI JOINT UPR EXTREM W/O DYE: CPT

## 2021-06-23 NOTE — TELEPHONE ENCOUNTER
Caller: Dominick Kumar    Relationship: Self    Best call back number: [phone]    What is the best time to reach you: ANYTIME    Who are you requesting to speak with (clinical staff, provider,  specific staff member): CLINICAL STAFF    Do you know the name of the person who called: SELF    What was the call regarding: PATIENT STATES THAT HE NEEDS A APPOINTMENT SET UP FOR PHYSICAL THERAPY.    Do you require a callback: YES

## 2021-06-23 NOTE — PROGRESS NOTES
Please call patient. MRI shows small tear of his supraspinatus tendon. I have placed referral to orthopedics. Continue physical therapy. Continue current medications.

## 2021-06-28 ENCOUNTER — TELEPHONE (OUTPATIENT)
Dept: NEUROLOGY | Facility: CLINIC | Age: 51
End: 2021-06-28

## 2021-06-28 ENCOUNTER — TELEPHONE (OUTPATIENT)
Dept: FAMILY MEDICINE CLINIC | Facility: CLINIC | Age: 51
End: 2021-06-28

## 2021-06-28 DIAGNOSIS — F32.1 CURRENT MODERATE EPISODE OF MAJOR DEPRESSIVE DISORDER WITHOUT PRIOR EPISODE (HCC): ICD-10-CM

## 2021-06-28 DIAGNOSIS — M54.2 NECK PAIN: ICD-10-CM

## 2021-06-28 DIAGNOSIS — E11.9 TYPE 2 DIABETES MELLITUS WITHOUT COMPLICATION, WITHOUT LONG-TERM CURRENT USE OF INSULIN (HCC): Primary | ICD-10-CM

## 2021-06-28 DIAGNOSIS — I77.9 BILATERAL CAROTID ARTERY DISEASE, UNSPECIFIED TYPE (HCC): ICD-10-CM

## 2021-06-28 DIAGNOSIS — I10 ESSENTIAL HYPERTENSION: ICD-10-CM

## 2021-06-28 DIAGNOSIS — E78.5 HYPERLIPIDEMIA, UNSPECIFIED HYPERLIPIDEMIA TYPE: ICD-10-CM

## 2021-06-28 DIAGNOSIS — M54.12 CERVICAL RADICULOPATHY: ICD-10-CM

## 2021-06-28 DIAGNOSIS — E11.9 TYPE 2 DIABETES MELLITUS WITHOUT COMPLICATION, WITHOUT LONG-TERM CURRENT USE OF INSULIN (HCC): ICD-10-CM

## 2021-06-28 RX ORDER — DULOXETIN HYDROCHLORIDE 60 MG/1
60 CAPSULE, DELAYED RELEASE ORAL DAILY
Qty: 30 CAPSULE | Refills: 5 | OUTPATIENT
Start: 2021-06-28

## 2021-06-28 RX ORDER — BLOOD-GLUCOSE METER
KIT MISCELLANEOUS
Qty: 1 EACH | Refills: 0 | Status: SHIPPED | OUTPATIENT
Start: 2021-06-28

## 2021-06-28 RX ORDER — BENAZEPRIL HYDROCHLORIDE 20 MG/1
20 TABLET ORAL DAILY
Qty: 30 TABLET | Refills: 5 | OUTPATIENT
Start: 2021-06-28

## 2021-06-28 RX ORDER — METHOCARBAMOL 750 MG/1
750 TABLET, FILM COATED ORAL 2 TIMES DAILY PRN
Qty: 60 TABLET | Refills: 5 | OUTPATIENT
Start: 2021-06-28

## 2021-06-28 RX ORDER — GLIPIZIDE 5 MG/1
5 TABLET ORAL
Qty: 60 TABLET | Refills: 2 | OUTPATIENT
Start: 2021-06-28

## 2021-06-28 NOTE — TELEPHONE ENCOUNTER
PATIENT STATES HE WAS SUPPOSED TO HAVE RECEIVED A PRESCRIPTION FOR A GLUCOMETER, BUT NOTHING WAS EVER CALLED IN.     PLEASE SEND TO HIS PHARMACY.      [id number] - [place], [place] - [address] [phone] [address] [phone] FX      CALLBACK:  [phone]

## 2021-06-28 NOTE — TELEPHONE ENCOUNTER
Caller: Dominick Kumar    Relationship: Self    Best call back number: [phone]    Has the patient been seen in the last 6 months? NO- LAST SEEN ON 9/15/21    If the patient has not been seen and the machine is broken, schedule an appointment and alert the practice- SCHEDULED ON 8/4/21    Current issue/concern with equipment: BROKEN MASK (NO LONGER FITS PROPERLY), AND NEW SUPPLIES    DME company: NO PREFERRED DME    What kind of mask type (full or nasal)? FULL MASK    Are you on a modem or chip? CHIP    PT WAS SCHEDULED FOR SLEEP STUDY ON 10/27/21 BUT NO SHOWED. I HAVE PROVIDED PT THE NUMBER TO SLEEP LAB [phone] TO CALL AND RESCHEDULE SLEEP STUDY. PT STATES THAT HIS CURRENT MASK IS BROKEN AND HE HAS BEEN SUPER GLUING THE MASK. PT STATES HE IS NOT GETTING SOUND ENOUGH SLEEP DUE TO THE BROKEN CPAP ALONG WITH WEIGHT GAIN FROM HIS DIABETES. PT WOULD LIKE TO KNOW IF WE COULD SEND ORDER FOR UPDATED SUPPLIES BEFORE SLEEP STUDY COMPLETION.    PLEASE REVIEW AND ADVISE.

## 2021-07-09 ENCOUNTER — OFFICE VISIT (OUTPATIENT)
Dept: ORTHOPEDIC SURGERY | Facility: CLINIC | Age: 51
End: 2021-07-09

## 2021-07-09 VITALS
HEART RATE: 100 BPM | BODY MASS INDEX: 38.36 KG/M2 | SYSTOLIC BLOOD PRESSURE: 172 MMHG | DIASTOLIC BLOOD PRESSURE: 92 MMHG | WEIGHT: 315 LBS | HEIGHT: 76 IN

## 2021-07-09 DIAGNOSIS — M75.21 BICEPS TENDINITIS OF RIGHT UPPER EXTREMITY: ICD-10-CM

## 2021-07-09 DIAGNOSIS — M75.41 IMPINGEMENT SYNDROME OF RIGHT SHOULDER: ICD-10-CM

## 2021-07-09 DIAGNOSIS — M75.51 BURSITIS OF RIGHT SHOULDER: ICD-10-CM

## 2021-07-09 DIAGNOSIS — M75.111 NONTRAUMATIC INCOMPLETE TEAR OF RIGHT ROTATOR CUFF: Primary | ICD-10-CM

## 2021-07-09 PROCEDURE — 99214 OFFICE O/P EST MOD 30 MIN: CPT | Performed by: ORTHOPAEDIC SURGERY

## 2021-07-09 RX ORDER — BLOOD-GLUCOSE METER
EACH MISCELLANEOUS
COMMUNITY
Start: 2021-06-29
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
## 2021-07-12 ENCOUNTER — TELEPHONE (OUTPATIENT)
Dept: NEUROLOGY | Facility: CLINIC | Age: 51
End: 2021-07-12

## 2021-07-12 NOTE — TELEPHONE ENCOUNTER
Provider: KERRY RIGGINS    Caller: PRATIK    Relationship to Patient: SELF    Reason for Call: PRATIK IS CALLING AND STATING HE NEEDS THE OFFICE TO SEND ANOTHER ORDER FOR A SLEEP STUDY TO THE SLEEP LAB.  HE TRIED TO MAKE THE APPOINTMENT HIMSELF BUT THEY SAID THEY NEEDED A NEW REFERRAL.    PLEASE CALL TO ADVISE.

## 2021-07-12 NOTE — TELEPHONE ENCOUNTER
Attempted to contact patient to see exactly what it is that he was needing.  No answer when calling, no voicemail.

## 2021-07-16 ENCOUNTER — TELEPHONE (OUTPATIENT)
Dept: NEUROLOGY | Facility: CLINIC | Age: 51
End: 2021-07-16

## 2021-07-16 NOTE — TELEPHONE ENCOUNTER
Caller: Dominick Kumar    Relationship: Self    Best call back number: [phone]    What is the best time to reach you: ANY    What was the call regarding: C-PAP    Do you require a callback: YES    PT CALLED AND WANT TO KNOW IF TO CONTINUE TO USE C-PAP       PT WANTS A CALL BACK REGARDING HIS APPT IN AUG. HE THINKS HE IS COMING IN FOR SLEEP STUDY TEST?? PLEASE VERIFY THIS AND DISCUSS WITH PT. HE SEEMS VERY CONFUSED ON WHAT HIS APPOINTS ARE FOR?

## 2021-07-19 NOTE — TELEPHONE ENCOUNTER
Called and spoke with patient let him know that [facility]ita is advising all of her patients to continue use of their machines if they have any other cormobidities and if the patient is comfortable with doing so.       We do not have a copy of the patient's sleep study. Where you going to order a study for the patient to complete?      While speaking with the patient he c/o falling asleep during the day even after he has used his machine and slept all night.

## 2021-07-20 NOTE — TELEPHONE ENCOUNTER
According to his chart he had a PSG scheduled 3 times, he cancelled twice and no showed once. I am assuming they will not schedule him again. Can you ask him about all of these episodes. Is he truly interested in having a PSG done? If so, I can enter another order and see if the sleep center will schedule him.

## 2021-07-26 NOTE — TELEPHONE ENCOUNTER
SPOKE WITH PT, HE DOES WANT TO HAVE SLEEP STUDY AT THIS TIME. NEW REFERRAL HAS BEEN PLACED AND SENT TO CS.

## 2021-07-30 ENCOUNTER — TRANSCRIBE ORDERS (OUTPATIENT)
Dept: LAB | Facility: HOSPITAL | Age: 51
End: 2021-07-30

## 2021-07-30 ENCOUNTER — LAB (OUTPATIENT)
Dept: LAB | Facility: HOSPITAL | Age: 51
End: 2021-07-30

## 2021-07-30 DIAGNOSIS — Z01.818 PRE-OP TESTING: Primary | ICD-10-CM

## 2021-07-30 DIAGNOSIS — Z01.818 PRE-OP TESTING: ICD-10-CM

## 2021-07-30 PROCEDURE — U0004 COV-19 TEST NON-CDC HGH THRU: HCPCS

## 2021-07-30 PROCEDURE — C9803 HOPD COVID-19 SPEC COLLECT: HCPCS

## 2021-07-31 LAB — SARS-COV-2 RNA NOSE QL NAA+PROBE: NOT DETECTED

## 2021-08-02 ENCOUNTER — HOSPITAL ENCOUNTER (OUTPATIENT)
Dept: SLEEP MEDICINE | Facility: HOSPITAL | Age: 51
Discharge: HOME | End: 2021-08-02
Admitting: NURSE PRACTITIONER

## 2021-08-02 DIAGNOSIS — E66.9 DIABETES MELLITUS TYPE 2 IN OBESE (HCC): ICD-10-CM

## 2021-08-02 DIAGNOSIS — E11.69 DIABETES MELLITUS TYPE 2 IN OBESE (HCC): ICD-10-CM

## 2021-08-02 DIAGNOSIS — G47.33 OBSTRUCTIVE SLEEP APNEA: ICD-10-CM

## 2021-08-02 DIAGNOSIS — I10 ESSENTIAL HYPERTENSION: ICD-10-CM

## 2021-08-02 PROCEDURE — 95811 POLYSOM 6/>YRS CPAP 4/> PARM: CPT

## 2021-08-02 PROCEDURE — 95811 POLYSOM 6/>YRS CPAP 4/> PARM: CPT | Performed by: INTERNAL MEDICINE

## 2021-08-04 ENCOUNTER — OFFICE VISIT (OUTPATIENT)
Dept: FAMILY MEDICINE CLINIC | Facility: CLINIC | Age: 51
End: 2021-08-04

## 2021-08-04 VITALS
OXYGEN SATURATION: 95 % | SYSTOLIC BLOOD PRESSURE: 138 MMHG | DIASTOLIC BLOOD PRESSURE: 88 MMHG | HEIGHT: 76 IN | WEIGHT: 315 LBS | BODY MASS INDEX: 38.36 KG/M2 | HEART RATE: 108 BPM

## 2021-08-04 DIAGNOSIS — E78.5 HYPERLIPIDEMIA, UNSPECIFIED HYPERLIPIDEMIA TYPE: ICD-10-CM

## 2021-08-04 DIAGNOSIS — K21.9 GASTROESOPHAGEAL REFLUX DISEASE WITHOUT ESOPHAGITIS: ICD-10-CM

## 2021-08-04 DIAGNOSIS — M75.41 IMPINGEMENT SYNDROME OF RIGHT SHOULDER: ICD-10-CM

## 2021-08-04 DIAGNOSIS — M75.111 NONTRAUMATIC INCOMPLETE TEAR OF RIGHT ROTATOR CUFF: ICD-10-CM

## 2021-08-04 DIAGNOSIS — I10 ESSENTIAL HYPERTENSION: ICD-10-CM

## 2021-08-04 DIAGNOSIS — E11.9 TYPE 2 DIABETES MELLITUS WITHOUT COMPLICATION, WITHOUT LONG-TERM CURRENT USE OF INSULIN (HCC): Primary | ICD-10-CM

## 2021-08-04 DIAGNOSIS — M54.12 CERVICAL RADICULOPATHY: ICD-10-CM

## 2021-08-04 DIAGNOSIS — F32.1 CURRENT MODERATE EPISODE OF MAJOR DEPRESSIVE DISORDER WITHOUT PRIOR EPISODE (HCC): ICD-10-CM

## 2021-08-04 DIAGNOSIS — G47.33 OSA (OBSTRUCTIVE SLEEP APNEA): ICD-10-CM

## 2021-08-04 LAB — HBA1C MFR BLD: 8.7 %

## 2021-08-04 PROCEDURE — 99214 OFFICE O/P EST MOD 30 MIN: CPT | Performed by: NURSE PRACTITIONER

## 2021-08-04 PROCEDURE — 83036 HEMOGLOBIN GLYCOSYLATED A1C: CPT | Performed by: NURSE PRACTITIONER

## 2021-08-04 RX ORDER — TRAMADOL HYDROCHLORIDE 50 MG/1
50 TABLET ORAL 2 TIMES DAILY PRN
Qty: 60 TABLET | Refills: 3
Start: 2021-08-04 | End: 2021-08-04

## 2021-08-04 RX ORDER — GLIPIZIDE 10 MG/1
10 TABLET, FILM COATED, EXTENDED RELEASE ORAL DAILY
Qty: 90 TABLET | Refills: 1 | Status: SHIPPED | OUTPATIENT
Start: 2021-08-04

## 2021-08-04 RX ORDER — TRAMADOL HYDROCHLORIDE 50 MG/1
50 TABLET ORAL 2 TIMES DAILY PRN
Qty: 60 TABLET | Refills: 3 | Status: SHIPPED | OUTPATIENT
Start: 2021-08-04 | End: 2022-02-21 | Stop reason: SDUPTHER

## 2021-08-10 ENCOUNTER — TREATMENT (OUTPATIENT)
Dept: PHYSICAL THERAPY | Facility: CLINIC | Age: 51
End: 2021-08-10

## 2021-08-10 DIAGNOSIS — M25.511 CHRONIC RIGHT SHOULDER PAIN: Primary | ICD-10-CM

## 2021-08-10 DIAGNOSIS — G89.29 CHRONIC RIGHT SHOULDER PAIN: Primary | ICD-10-CM

## 2021-08-10 PROCEDURE — 97162 PT EVAL MOD COMPLEX 30 MIN: CPT | Performed by: PHYSICAL THERAPIST
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
## 2021-08-11 ENCOUNTER — TELEPHONE (OUTPATIENT)
Dept: NEUROLOGY | Facility: CLINIC | Age: 51
End: 2021-08-11

## 2021-08-11 NOTE — TELEPHONE ENCOUNTER
PT CALLED STATING THAT SOMEONE FROM THE OFFICE CALLED HIM BUT I DO NOT SEE ANYTHING IN ANYWHERE SUGGESTING OFFICE CALLED. PLEASE CALL PT BACK.     [phone]

## 2021-08-18 ENCOUNTER — TREATMENT (OUTPATIENT)
Dept: PHYSICAL THERAPY | Facility: CLINIC | Age: 51
End: 2021-08-18

## 2021-08-18 DIAGNOSIS — G89.29 CHRONIC RIGHT SHOULDER PAIN: Primary | ICD-10-CM

## 2021-08-18 DIAGNOSIS — M25.511 CHRONIC RIGHT SHOULDER PAIN: Primary | ICD-10-CM

## 2021-08-18 PROCEDURE — 97110 THERAPEUTIC EXERCISES: CPT | Performed by: PHYSICAL THERAPIST

## 2021-08-18 NOTE — PROGRESS NOTES
Physical Therapy Daily Progress Note      Visit #: 2    Dominick Kumar reports 6-7/10 pain today at rest.  Pt reports that his R arm is really hurting him today. Pt reports that before he came today he was sitting on the couch and resting his R arm along the back. Pt reports that he brought his arm down and there was a sudden, shooting pain down his arm.         Objective Pt present to PT today with notable sweating and guarding of the shoulder.     Pt with no motion past 90 degrees with pulleys due to pain.     Pt with pain with elbow flexion during manual treatment. Pt shot up into his R shoulder.     Pt with shooting pain with flexion to 50-60 degrees and seized up with pain.     Pt with no UT tenderness with palpation and massage.     Pt unable to tolerate PROM today and by end of session was at 30 degrees of flexion passively.       See Exercise, Manual, and Modality Logs for complete treatment.     Assessment/Plan  Pt unable to do much today due to pain in the R shoulder. Pt is very guarded. Pt is adamant that his pain is coming from his RC tears. Pt to follow up again with PT to continue as tolerated with treatment.       Progress per Plan of Care      Visit Diagnosis:    ICD-10-CM ICD-9-CM   1. Chronic right shoulder pain  M25.511 719.41    G89.29 338.29            Manual Therapy:    10     mins  16800;  Therapeutic Exercise:    25     mins  02755;     Neuromuscular Nicanor:        mins  12300;    Therapeutic Activity:          mins  96155;     Gait Training:           mins  89357;     Ultrasound:          mins  59377;    Electrical Stimulation:         mins  62104 ( );  Dry Needling          mins self-pay  Iontophoresis          mins 23252      Timed Treatment:   35   mins   Total Treatment:     35   mins    Ronald Buitrago, PT  Physical Therapist

## 2021-08-24 ENCOUNTER — TREATMENT (OUTPATIENT)
Dept: PHYSICAL THERAPY | Facility: CLINIC | Age: 51
End: 2021-08-24

## 2021-08-24 DIAGNOSIS — G89.29 CHRONIC RIGHT SHOULDER PAIN: Primary | ICD-10-CM

## 2021-08-24 DIAGNOSIS — M25.511 CHRONIC RIGHT SHOULDER PAIN: Primary | ICD-10-CM

## 2021-08-24 PROCEDURE — 97140 MANUAL THERAPY 1/> REGIONS: CPT | Performed by: PHYSICAL THERAPIST

## 2021-08-24 PROCEDURE — 97530 THERAPEUTIC ACTIVITIES: CPT | Performed by: PHYSICAL THERAPIST

## 2021-08-24 NOTE — PROGRESS NOTES
Physical Therapy Daily Progress Note      Visit #: 3    Dominick Kumar reports 6/10 pain today at rest.  Pt reports that his R shoulder is not feeling well today and that he tossed and turned last night and today trying to get some rest. Pt reports that he used a massager on his shoulder that gave him temporary relief. Pt reports that he has been working with the bands which has caused the shoulder to burn a lot. Pt reports that he has not pushed the shoulder too much but has burning and pain in the arm after exercise. Pt expressed his frustration with his pain and lack of function and says he is feeling really down today.         Objective Pt present to PT today with no distress at rest.     Pt and PT discussed inflammatory cycle and pt educated to avoid pain in the shoulder while doing as much as he can with the shoulder.     Pt with tenderness in the R UT.     Pt with 5.5/10 pain following activities today.     Pt educated to do chin tucks and shoulder squeezes and to avoid painful activities.       See Exercise, Manual, and Modality Logs for complete treatment.     Assessment/Plan  Pt and PT spent a lot of today discussing his POC and what he needs to be doing and avoiding at the house. Pt has been trying to push through the pain but was educated to avoid painful stimulus as it keeps the shoulder inflamed. Pt is going to do light HEP and PT will concentrate on minimal shoulder motion while working on the neck to see if shoulder and arm pain improves.       Progress per Plan of Care      Visit Diagnosis:    ICD-10-CM ICD-9-CM   1. Chronic right shoulder pain  M25.511 719.41    G89.29 338.29            Manual Therapy:    16     mins  04075;  Therapeutic Exercise:    5     mins  82959;     Neuromuscular Nicanor:        mins  92931;    Therapeutic Activity:     11     mins  67170;     Gait Training:           mins  52613;     Ultrasound:          mins  87590;    Electrical Stimulation:         mins  74092 ( );  Dry  Needling          mins self-pay  Iontophoresis          mins 56064      Timed Treatment:   32   mins   Total Treatment:     45   mins    Ronald Buitrago, PT  Physical Therapist

## 2021-08-30 ENCOUNTER — TELEPHONE (OUTPATIENT)
Dept: ORTHOPEDIC SURGERY | Facility: CLINIC | Age: 51
End: 2021-08-30

## 2021-08-30 NOTE — TELEPHONE ENCOUNTER
I called and spoke with the patient.  He has tried tylenol but is unable to take ibuprofen.  It is not helping.  He is requesting a muscle relaxer or pain medication.  His pharmacy is on file.  Please advise.  Thanks.  Alanis

## 2021-08-30 NOTE — TELEPHONE ENCOUNTER
Provider: DR RAY    Caller: PRATIK BARAJAS    Relationship to Patient: SELF    Pharmacy: [facility] ( [facility])    Phone Number: [phone]    Reason for Call: PATIENT ID CALLING TO SEE ABOUT GETTING A MUSCLE RELAXER OR PAIN MEDICATION PRESCRIBED TO HIM FOR HIS RIGHT SHOULDER PAIN, PLEASE CALL TO DISCUSS

## 2021-08-30 NOTE — TELEPHONE ENCOUNTER
Feliz Hart MD  You 34 minutes ago (12:27 PM)   BM  His notes indicate he’s on Robaxin 750 bid with refills and Tramadol. Chronic pain. I’m not sure who prescribes these for him.     I would recommend he check with his pcp for a chronic pain management provider if he doesn’t have one.     I would not recommend opioids and robaxin is the muscle spasm medicine I use/he’s already on    Message text      I called and let him know that he has refills on the other medication.   He said he went out of town and they put them back.  He called them and they should be ready for him this afternoon. Alanis

## 2021-09-02 ENCOUNTER — TELEPHONE (OUTPATIENT)
Dept: NEUROLOGY | Facility: CLINIC | Age: 51
End: 2021-09-02

## 2021-09-02 NOTE — TELEPHONE ENCOUNTER
Attempted to contact patient to cancel his appointment today. Provider would like to wait for patient to get his new machine. Reschedule him back in 3 months.    lvm for patient to return call.

## 2021-09-07 ENCOUNTER — TREATMENT (OUTPATIENT)
Dept: PHYSICAL THERAPY | Facility: CLINIC | Age: 51
End: 2021-09-07

## 2021-09-07 DIAGNOSIS — M25.511 CHRONIC RIGHT SHOULDER PAIN: Primary | ICD-10-CM

## 2021-09-07 DIAGNOSIS — G89.29 CHRONIC RIGHT SHOULDER PAIN: Primary | ICD-10-CM

## 2021-09-07 PROCEDURE — 97110 THERAPEUTIC EXERCISES: CPT | Performed by: PHYSICAL THERAPIST

## 2021-09-07 PROCEDURE — 97140 MANUAL THERAPY 1/> REGIONS: CPT | Performed by: PHYSICAL THERAPIST

## 2021-09-07 NOTE — PROGRESS NOTES
Physical Therapy Daily Progress Note      Visit #: 4    Dominick Kumar reports 5-6/10 pain today at rest.  Pt reports that he still has trouble reaching with and lifting his R arm. Pt reports that he tried taking the pain medication his doctor gave him but it makes him feel exhausted. Pt reports that he has tingling in the 4th, 5th, and 1st digits. Pt reports that 1st digit tingling is new in the last week.         Objective Pt present to PT today with no distress at rest.     PT did not perform shoulder PROM due to extreme pain in the past several visits during this activity.     Pt did well with thoracic and cervical mobilization with decreased symptoms in the hand.       See Exercise, Manual, and Modality Logs for complete treatment.     Assessment/Plan  Pt continues to have pain in the R shoulder extending all the way into the R hand. Pt is seeing ortho again in 2 days and will contact PT if doctor wants him to continue.       Progress per Plan of Care  Await word from pt    Visit Diagnosis:    ICD-10-CM ICD-9-CM   1. Chronic right shoulder pain  M25.511 719.41    G89.29 338.29            Manual Therapy:    16     mins  84368;  Therapeutic Exercise:    9     mins  87886;     Neuromuscular Nicanor:        mins  20576;    Therapeutic Activity:          mins  74930;     Gait Training:           mins  40024;     Ultrasound:          mins  78821;    Electrical Stimulation:         mins  44207 ( );  Dry Needling          mins self-pay  Iontophoresis          mins 90244      Timed Treatment:   25   mins   Total Treatment:     40   mins    Ronald Buitrago, PT  Physical Therapist

## 2021-09-09 ENCOUNTER — OFFICE VISIT (OUTPATIENT)
Dept: ORTHOPEDIC SURGERY | Facility: CLINIC | Age: 51
End: 2021-09-09

## 2021-09-09 VITALS
SYSTOLIC BLOOD PRESSURE: 181 MMHG | BODY MASS INDEX: 38.36 KG/M2 | HEIGHT: 76 IN | HEART RATE: 89 BPM | WEIGHT: 315 LBS | DIASTOLIC BLOOD PRESSURE: 98 MMHG

## 2021-09-09 DIAGNOSIS — R20.0 NUMBNESS AND TINGLING OF RIGHT HAND: ICD-10-CM

## 2021-09-09 DIAGNOSIS — M75.21 BICEPS TENDINITIS OF RIGHT UPPER EXTREMITY: ICD-10-CM

## 2021-09-09 DIAGNOSIS — M75.111 NONTRAUMATIC INCOMPLETE TEAR OF RIGHT ROTATOR CUFF: ICD-10-CM

## 2021-09-09 DIAGNOSIS — M75.41 IMPINGEMENT SYNDROME OF RIGHT SHOULDER: ICD-10-CM

## 2021-09-09 DIAGNOSIS — M75.51 BURSITIS OF RIGHT SHOULDER: ICD-10-CM

## 2021-09-09 DIAGNOSIS — R20.2 NUMBNESS AND TINGLING OF RIGHT HAND: ICD-10-CM

## 2021-09-09 DIAGNOSIS — M54.12 CERVICAL RADICULOPATHY: Primary | ICD-10-CM

## 2021-09-09 PROCEDURE — 99214 OFFICE O/P EST MOD 30 MIN: CPT | Performed by: ORTHOPAEDIC SURGERY
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
## 2021-10-07 PROBLEM — M48.02 CERVICAL SPINAL STENOSIS: Status: ACTIVE | Noted: 2021-10-07

## 2021-10-07 PROBLEM — M50.20 CERVICAL DISC DISPLACEMENT: Status: ACTIVE | Noted: 2021-10-07

## 2021-10-07 PROBLEM — M50.120 CERVICAL DISC DISORDER WITH RADICULOPATHY OF MID-CERVICAL REGION: Status: ACTIVE | Noted: 2021-10-07

## 2021-10-11 ENCOUNTER — TREATMENT (OUTPATIENT)
Dept: PHYSICAL THERAPY | Facility: CLINIC | Age: 51
End: 2021-10-11

## 2021-10-11 DIAGNOSIS — M54.12 CERVICAL RADICULOPATHY: Primary | ICD-10-CM

## 2021-10-11 PROCEDURE — 97162 PT EVAL MOD COMPLEX 30 MIN: CPT | Performed by: PHYSICAL THERAPIST
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
## 2021-10-12 ENCOUNTER — OFFICE VISIT (OUTPATIENT)
Dept: PAIN MEDICINE | Facility: CLINIC | Age: 51
End: 2021-10-12

## 2021-10-12 VITALS
OXYGEN SATURATION: 98 % | BODY MASS INDEX: 38.36 KG/M2 | HEART RATE: 81 BPM | HEIGHT: 76 IN | SYSTOLIC BLOOD PRESSURE: 153 MMHG | DIASTOLIC BLOOD PRESSURE: 91 MMHG | WEIGHT: 315 LBS

## 2021-10-12 DIAGNOSIS — F32.1 CURRENT MODERATE EPISODE OF MAJOR DEPRESSIVE DISORDER WITHOUT PRIOR EPISODE (HCC): ICD-10-CM

## 2021-10-12 DIAGNOSIS — G47.33 OSA (OBSTRUCTIVE SLEEP APNEA): ICD-10-CM

## 2021-10-12 DIAGNOSIS — M75.41 IMPINGEMENT SYNDROME OF RIGHT SHOULDER: ICD-10-CM

## 2021-10-12 DIAGNOSIS — E66.01 CLASS 3 SEVERE OBESITY WITH SERIOUS COMORBIDITY AND BODY MASS INDEX (BMI) OF 40.0 TO 44.9 IN ADULT, UNSPECIFIED OBESITY TYPE (HCC): ICD-10-CM

## 2021-10-12 DIAGNOSIS — Z71.6 ENCOUNTER FOR SMOKING CESSATION COUNSELING: ICD-10-CM

## 2021-10-12 DIAGNOSIS — M50.120 CERVICAL DISC DISORDER WITH RADICULOPATHY OF MID-CERVICAL REGION: ICD-10-CM

## 2021-10-12 DIAGNOSIS — M75.21 BICEPS TENDINITIS OF RIGHT UPPER EXTREMITY: ICD-10-CM

## 2021-10-12 DIAGNOSIS — M50.120 CERVICAL DISC DISORDER WITH RADICULOPATHY OF MID-CERVICAL REGION: Primary | ICD-10-CM

## 2021-10-12 DIAGNOSIS — E11.9 TYPE 2 DIABETES MELLITUS WITHOUT COMPLICATION, WITHOUT LONG-TERM CURRENT USE OF INSULIN (HCC): ICD-10-CM

## 2021-10-12 DIAGNOSIS — M50.20 CERVICAL DISC DISPLACEMENT: ICD-10-CM

## 2021-10-12 DIAGNOSIS — M75.51 BURSITIS OF RIGHT SHOULDER: ICD-10-CM

## 2021-10-12 DIAGNOSIS — M48.02 CERVICAL SPINAL STENOSIS: ICD-10-CM

## 2021-10-12 DIAGNOSIS — M75.111 NONTRAUMATIC INCOMPLETE TEAR OF RIGHT ROTATOR CUFF: ICD-10-CM

## 2021-10-12 DIAGNOSIS — F17.200 CURRENT EVERY DAY SMOKER: ICD-10-CM

## 2021-10-12 PROCEDURE — 99204 OFFICE O/P NEW MOD 45 MIN: CPT | Performed by: ANESTHESIOLOGY

## 2021-10-15 ENCOUNTER — PATIENT ROUNDING (BHMG ONLY) (OUTPATIENT)
Dept: PAIN MEDICINE | Facility: CLINIC | Age: 51
End: 2021-10-15

## 2021-10-15 NOTE — PROGRESS NOTES
October 15, 2021    Hello, may I speak with Dominick Kumar?    My name is       I am  with [facility]  [address] [address] [phone].    Before we get started may I verify your date of birth? 1970    I am calling to officially welcome you to our practice and ask about your recent visit. Is this a good time to talk? NO VOICEMAIL LEFT    Tell me about your visit with us. What things went well?         We're always looking for ways to make our patients' experiences even better. Do you have recommendations on ways we may improve?      Overall were you satisfied with your first visit to our practice?        I appreciate you taking the time to speak with me today. Is there anything else I can do for you?       Thank you, and have a great day.

## 2021-10-20 ENCOUNTER — HOSPITAL ENCOUNTER (OUTPATIENT)
Dept: NEUROLOGY | Facility: HOSPITAL | Age: 51
Discharge: HOME | End: 2021-10-20
Admitting: ORTHOPAEDIC SURGERY

## 2021-10-20 DIAGNOSIS — M75.111 NONTRAUMATIC INCOMPLETE TEAR OF RIGHT ROTATOR CUFF: ICD-10-CM

## 2021-10-20 DIAGNOSIS — M75.21 BICEPS TENDINITIS OF RIGHT UPPER EXTREMITY: ICD-10-CM

## 2021-10-20 DIAGNOSIS — M75.51 BURSITIS OF RIGHT SHOULDER: ICD-10-CM

## 2021-10-20 DIAGNOSIS — R20.2 NUMBNESS AND TINGLING OF RIGHT HAND: ICD-10-CM

## 2021-10-20 DIAGNOSIS — M75.41 IMPINGEMENT SYNDROME OF RIGHT SHOULDER: ICD-10-CM

## 2021-10-20 DIAGNOSIS — R20.0 NUMBNESS AND TINGLING OF RIGHT HAND: ICD-10-CM

## 2021-10-20 DIAGNOSIS — M54.12 CERVICAL RADICULOPATHY: ICD-10-CM

## 2021-10-20 PROCEDURE — 95886 MUSC TEST DONE W/N TEST COMP: CPT

## 2021-10-20 PROCEDURE — 95911 NRV CNDJ TEST 9-10 STUDIES: CPT | Performed by: PSYCHIATRY & NEUROLOGY

## 2021-10-20 PROCEDURE — 95886 MUSC TEST DONE W/N TEST COMP: CPT | Performed by: PSYCHIATRY & NEUROLOGY

## 2021-10-20 PROCEDURE — 95911 NRV CNDJ TEST 9-10 STUDIES: CPT

## 2021-10-27 ENCOUNTER — OUTSIDE FACILITY SERVICE (OUTPATIENT)
Dept: PAIN MEDICINE | Facility: CLINIC | Age: 51
End: 2021-10-27

## 2021-10-27 PROCEDURE — 62321 NJX INTERLAMINAR CRV/THRC: CPT | Performed by: ANESTHESIOLOGY

## 2021-10-27 PROCEDURE — 99152 MOD SED SAME PHYS/QHP 5/>YRS: CPT | Performed by: ANESTHESIOLOGY

## 2021-10-28 ENCOUNTER — TELEPHONE (OUTPATIENT)
Dept: PAIN MEDICINE | Facility: CLINIC | Age: 51
End: 2021-10-28

## 2021-10-28 NOTE — TELEPHONE ENCOUNTER
Spoke with pt regarding yesterday’s procedure with Dr. Curran. Pt stated they are doing well, with no complaints. Pt stated feeling better. Advised offollow up appointment, reminder card in the mail.

## 2021-11-01 ENCOUNTER — TELEPHONE (OUTPATIENT)
Dept: PAIN MEDICINE | Facility: CLINIC | Age: 51
End: 2021-11-01

## 2021-11-01 NOTE — TELEPHONE ENCOUNTER
Provider: BRANDIN  Caller: PRATIK BARAJAS  Relationship to Patient: PATIENT   Pharmacy: N/A   Phone Number: [phone]  Reason for Call: MEDICAL COMPLAINT POST INJECTION  When was the patient last seen: 10.27.22  When did it start: 10.31.21  Where is it located: NECK  Characteristics of symptom/severity: PAIN, STIFFNESS, DIFFICULTY MOVING NEXT TO THE RIGHT  Timing- Is it constant or intermittent: CONSISTENT- RATES PAIN 7/10  What makes it worse: NA  What makes it better: NA  What therapies/medications have you tried: NA    FELT FINE YESTERDAY; STIFFNESS AND SORENESS STARTED THIS A.M.

## 2021-11-01 NOTE — TELEPHONE ENCOUNTER
"Spoke with pt who stated that he was doing better, that he took a massager to his neck and feels better but he still feels \"knot\" and its hard turning to the right. Advised pt he can take tylenol/ibuprofen  pt stated he can't take ibuprofen, but will take aleve. Advised to try heat/ice also. Pt also stated that he can't do physical therapy until the 11/8/21, that was the soonest opening available- pt tried sooner but hasn't heard anything.  Pt stated that the pain is better, he  doesn't have pain down arm into hand right now. Advised pt that if things don't improve in the next day or two to give the office a call back. Pt  Understood.   "

## 2021-11-05 ENCOUNTER — OFFICE VISIT (OUTPATIENT)
Dept: FAMILY MEDICINE CLINIC | Facility: CLINIC | Age: 51
End: 2021-11-05

## 2021-11-05 VITALS
SYSTOLIC BLOOD PRESSURE: 149 MMHG | HEIGHT: 76 IN | OXYGEN SATURATION: 97 % | TEMPERATURE: 97.3 F | BODY MASS INDEX: 38.36 KG/M2 | WEIGHT: 315 LBS | HEART RATE: 81 BPM | DIASTOLIC BLOOD PRESSURE: 85 MMHG

## 2021-11-05 DIAGNOSIS — F32.1 CURRENT MODERATE EPISODE OF MAJOR DEPRESSIVE DISORDER WITHOUT PRIOR EPISODE (HCC): ICD-10-CM

## 2021-11-05 DIAGNOSIS — M54.12 CERVICAL RADICULOPATHY: ICD-10-CM

## 2021-11-05 DIAGNOSIS — I10 PRIMARY HYPERTENSION: ICD-10-CM

## 2021-11-05 DIAGNOSIS — K21.9 GASTROESOPHAGEAL REFLUX DISEASE WITHOUT ESOPHAGITIS: ICD-10-CM

## 2021-11-05 DIAGNOSIS — E78.5 HYPERLIPIDEMIA, UNSPECIFIED HYPERLIPIDEMIA TYPE: ICD-10-CM

## 2021-11-05 DIAGNOSIS — E66.01 CLASS 3 SEVERE OBESITY WITH SERIOUS COMORBIDITY AND BODY MASS INDEX (BMI) OF 40.0 TO 44.9 IN ADULT, UNSPECIFIED OBESITY TYPE (HCC): ICD-10-CM

## 2021-11-05 DIAGNOSIS — I77.9 BILATERAL CAROTID ARTERY DISEASE, UNSPECIFIED TYPE (HCC): ICD-10-CM

## 2021-11-05 DIAGNOSIS — I10 ESSENTIAL HYPERTENSION: ICD-10-CM

## 2021-11-05 DIAGNOSIS — E11.9 TYPE 2 DIABETES MELLITUS WITHOUT COMPLICATION, WITHOUT LONG-TERM CURRENT USE OF INSULIN (HCC): Primary | ICD-10-CM

## 2021-11-05 LAB
EXPIRATION DATE: NORMAL
HBA1C MFR BLD: 7.6 %
Lab: NORMAL

## 2021-11-05 PROCEDURE — 99214 OFFICE O/P EST MOD 30 MIN: CPT | Performed by: NURSE PRACTITIONER

## 2021-11-05 PROCEDURE — 3051F HG A1C>EQUAL 7.0%<8.0%: CPT | Performed by: NURSE PRACTITIONER

## 2021-11-05 PROCEDURE — 83036 HEMOGLOBIN GLYCOSYLATED A1C: CPT | Performed by: NURSE PRACTITIONER

## 2021-11-05 NOTE — PROGRESS NOTES
Subjective     Chief Complaint:    Chief Complaint   Patient presents with   • Follow-up       History of Present Illness:   He takes benazeprl and norvasc for HTN. Tolerating without side effects.   He is taking metformin and glipizide for diabetes. Struggles with diet and exercise at times. He needs more test strips.   He takes statin for HLD.   He has refractory depression. Takes cymbalta. No SI/HI. Feels like his mood is ok but has been having some anxiety. Having some issues with his current living situation.  He is having some trouble sleeping and vivid dreams. He has NATALIE and trouble with his mask. He has upcoming appt with sleep medicine.   GERD controlled with PPI  Has chronic pain in hip, back, neck, shoulder. Neck pain radiates to right arm and hand. Has seen ortho and most recently pain management. Had injection in his neck, he notes his pain is more tolerable but still present. He upcoming appt with PT. He is taking tramadol but needs refilled. His chronic back pain limits his ability to walk no more than 100 yards, stand longer than 20 minutes, or be on his feet more than 2-3 hours due to pain. His shoulder pain causes numbness in right arm and hand. He has difficulty reaching, gripping, handling, and fingering. This limits his ability to use his right arm frequently.     Review of Systems  Gen- No fevers, chills  CV- No chest pain, palpitations  Resp- No cough, dyspnea  GI- No N/V/D, abd pain  Neuro-No dizziness, headaches      I have reviewed and/or updated the patient's past medical, surgical, family, social history and problem list as appropriate.     Medications:    Current Outpatient Medications:   •  amLODIPine (NORVASC) 10 MG tablet, Take 1 tablet by mouth Daily., Disp: 90 tablet, Rfl: 1  •  aspirin 81 MG EC tablet, Take 81 mg by mouth Daily., Disp: , Rfl:   •  atorvastatin (Lipitor) 20 MG tablet, Take 1 tablet by mouth Every Night., Disp: 90 tablet, Rfl: 1  •  benazepril (LOTENSIN) 20 MG  "tablet, Take 1 tablet by mouth Daily., Disp: 30 tablet, Rfl: 5  •  Blood Glucose Monitoring Suppl (ONE TOUCH ULTRA 2) w/Device kit, USE TO CHECK BLOOD GLUCOSE ONCE DAILY OR AS DIRECTED, Disp: , Rfl:   •  DULoxetine (CYMBALTA) 60 MG capsule, Take 1 capsule by mouth Daily., Disp: 30 capsule, Rfl: 5  •  etodolac (LODINE) 200 MG capsule, Take 1 capsule by mouth Every 8 (Eight) Hours., Disp: 15 capsule, Rfl: 0  •  glipizide (GLUCOTROL XL) 10 MG 24 hr tablet, Take 1 tablet by mouth Daily., Disp: 90 tablet, Rfl: 1  •  glucose blood test strip, Check glucose once daily, Disp: 25 each, Rfl: 12  •  glucose monitor monitoring kit, Check glucose once daily, Disp: 1 each, Rfl: 0  •  metFORMIN (GLUCOPHAGE) 1000 MG tablet, Take 1 tablet by mouth 2 (Two) Times a Day With Meals. Take 1 tablet by mouth once daily x 1 week, then increase to 1 tablet twice daily, Disp: 60 tablet, Rfl: 5  •  methocarbamol (ROBAXIN) 750 MG tablet, Take 1 tablet by mouth 2 (Two) Times a Day As Needed for Muscle Spasms., Disp: 60 tablet, Rfl: 5  •  Multiple Vitamins-Minerals (MULTIVITAMIN WITH MINERALS) tablet tablet, Take 1 tablet by mouth Daily., Disp: , Rfl:   •  pantoprazole (PROTONIX) 20 MG EC tablet, Take 1 tablet by mouth Daily., Disp: 90 tablet, Rfl: 1  •  SITagliptin (Januvia) 100 MG tablet, Take 1 tablet by mouth Daily., Disp: 90 tablet, Rfl: 1  •  traMADol (ULTRAM) 50 MG tablet, Take 1 tablet by mouth 2 (Two) Times a Day As Needed for Moderate Pain ., Disp: 60 tablet, Rfl: 3    Allergies:  Allergies   Allergen Reactions   • Corticosteroids Other (See Comments)     hypertension   • Erythromycin Photosensitivity     headache       Objective     Vital Signs:   Vitals:    11/05/21 1313   BP: 149/85   Pulse: 81   Temp: 97.3 °F (36.3 °C)   SpO2: 97%   Weight: (!) 156 kg (344 lb 12.8 oz)   Height: 193 cm (76\")   PainSc:   7     Body mass index is 41.97 kg/m².    Physical Exam:    Physical Exam  Vitals and nursing note reviewed.   Constitutional:       " Appearance: He is well-developed. He is obese.   HENT:      Head: Normocephalic and atraumatic.   Eyes:      Pupils: Pupils are equal, round, and reactive to light.   Cardiovascular:      Rate and Rhythm: Normal rate and regular rhythm.      Heart sounds: Normal heart sounds.   Pulmonary:      Effort: Pulmonary effort is normal.      Breath sounds: Normal breath sounds.   Abdominal:      General: Bowel sounds are normal. There is no distension.      Palpations: Abdomen is soft.      Tenderness: There is no abdominal tenderness.   Musculoskeletal:      Right shoulder: Tenderness present. Decreased range of motion.      Cervical back: Neck supple. Tenderness present. Decreased range of motion.      Thoracic back: Tenderness present.      Lumbar back: Tenderness present.   Skin:     General: Skin is warm and dry.   Neurological:      Mental Status: He is alert and oriented to person, place, and time.      Gait: Gait abnormal.   Psychiatric:         Mood and Affect: Mood is depressed.         Behavior: Behavior normal.         Assessment / Plan     Assessment/Plan:   Problem List Items Addressed This Visit        Cardiac and Vasculature    Hypertension    Relevant Medications    benazepril (LOTENSIN) 20 MG tablet    amLODIPine (NORVASC) 10 MG tablet    Hyperlipidemia    Relevant Medications    atorvastatin (Lipitor) 20 MG tablet       Endocrine and Metabolic    Class 3 severe obesity with serious comorbidity and body mass index (BMI) of 40.0 to 44.9 in adult (HCC)    Type 2 diabetes mellitus without complication, without long-term current use of insulin ([facility]) - Primary    Relevant Medications    metFORMIN (GLUCOPHAGE) 1000 MG tablet    glucose monitor monitoring kit    glipizide (GLUCOTROL XL) 10 MG 24 hr tablet    SITagliptin (Januvia) 100 MG tablet    glucose blood test strip    Other Relevant Orders    POC Glycosylated Hemoglobin (Hb A1C) (Completed)    TSH Rfx On Abnormal To Free T4    Lipid Panel    Comprehensive  Metabolic Panel    CBC Auto Differential    Microalbumin / Creatinine Urine Ratio - Urine, Clean Catch       Gastrointestinal Abdominal     Gastroesophageal reflux disease without esophagitis    Relevant Medications    pantoprazole (PROTONIX) 20 MG EC tablet       Mental Health    Current moderate episode of major depressive disorder without prior episode (HCC)    Relevant Medications    DULoxetine (CYMBALTA) 60 MG capsule       Neuro    Cervical radiculopathy    Relevant Medications    methocarbamol (ROBAXIN) 750 MG tablet    traMADol (ULTRAM) 50 MG tablet      Other Visit Diagnoses     Essential hypertension        Bilateral carotid artery disease, unspecified type (HCC)            -- A1c 7.6. Continue current medications. Much improved A1c  -- Follow diabetic diet,  limit carbs, increase protein in moderation, increase water intake, Monitor blood sugars as discussed, See eye doctor annually or as discussed, Wear protective foot wear/no bare feet, Check feet regularly for calluses or ulcers, Exercise as tolerated up to 30 minutes 5 days a week, Take all medications as prescribed  --Blood pressure with suboptima control but improved. Continue amlodipine, Benzapril  --Continue Cymbalta for mood.  He does have refractory depression.  We have discussed counseling before he declines   --Continue statin and asa for secondary prevention and carotid disease  -- keep f/u with sleep medicine  -- continue tramadol as needed for pain. He has hx of gastritis and GI has recommended that he avoid NSAIDs  -- keep f/u with ortho and pain management  -- his chronic pain effects his daily life. Currently does not appear to be able to sustain substantial work. Is pursing disability of which I agree  -- encouraged covid vaccine, he decline flu vaccine    Follow up:  3 months    Electronically signed by AMA Ewing   11/05/2021 13:45 EDT      Please note that portions of this note may have been completed with a voice  recognition program. Efforts were made to edit the dictations, but occasionally words are mistranscribed.

## 2021-11-06 LAB
ALBUMIN SERPL-MCNC: 4.6 G/DL (ref 3.8–4.9)
ALBUMIN/CREAT UR: 89 MG/G CREAT (ref 0–29)
ALBUMIN/GLOB SERPL: 1.5 {RATIO} (ref 1.2–2.2)
ALP SERPL-CCNC: 100 IU/L (ref 44–121)
ALT SERPL-CCNC: 54 IU/L (ref 0–44)
AST SERPL-CCNC: 29 IU/L (ref 0–40)
BASOPHILS # BLD AUTO: 0 X10E3/UL (ref 0–0.2)
BASOPHILS NFR BLD AUTO: 0 %
BILIRUB SERPL-MCNC: 0.3 MG/DL (ref 0–1.2)
BUN SERPL-MCNC: 10 MG/DL (ref 6–24)
BUN/CREAT SERPL: 15 (ref 9–20)
CALCIUM SERPL-MCNC: 9.5 MG/DL (ref 8.7–10.2)
CHLORIDE SERPL-SCNC: 100 MMOL/L (ref 96–106)
CHOLEST SERPL-MCNC: 201 MG/DL (ref 100–199)
CO2 SERPL-SCNC: 23 MMOL/L (ref 20–29)
CREAT SERPL-MCNC: 0.68 MG/DL (ref 0.76–1.27)
CREAT UR-MCNC: 47.8 MG/DL
EOSINOPHIL # BLD AUTO: 0.2 X10E3/UL (ref 0–0.4)
EOSINOPHIL NFR BLD AUTO: 3 %
ERYTHROCYTE [DISTWIDTH] IN BLOOD BY AUTOMATED COUNT: 12.6 % (ref 11.6–15.4)
GLOBULIN SER CALC-MCNC: 3.1 G/DL (ref 1.5–4.5)
GLUCOSE SERPL-MCNC: 129 MG/DL (ref 65–99)
HCT VFR BLD AUTO: 44 % (ref 37.5–51)
HDLC SERPL-MCNC: 38 MG/DL
HGB BLD-MCNC: 15.6 G/DL (ref 13–17.7)
IMM GRANULOCYTES # BLD AUTO: 0 X10E3/UL (ref 0–0.1)
IMM GRANULOCYTES NFR BLD AUTO: 1 %
LDLC SERPL CALC-MCNC: 122 MG/DL (ref 0–99)
LYMPHOCYTES # BLD AUTO: 2.5 X10E3/UL (ref 0.7–3.1)
LYMPHOCYTES NFR BLD AUTO: 35 %
MCH RBC QN AUTO: 31.2 PG (ref 26.6–33)
MCHC RBC AUTO-ENTMCNC: 35.5 G/DL (ref 31.5–35.7)
MCV RBC AUTO: 88 FL (ref 79–97)
MICROALBUMIN UR-MCNC: 42.6 UG/ML
MONOCYTES # BLD AUTO: 0.7 X10E3/UL (ref 0.1–0.9)
MONOCYTES NFR BLD AUTO: 10 %
NEUTROPHILS # BLD AUTO: 3.7 X10E3/UL (ref 1.4–7)
NEUTROPHILS NFR BLD AUTO: 51 %
PLATELET # BLD AUTO: 218 X10E3/UL (ref 150–450)
POTASSIUM SERPL-SCNC: 4 MMOL/L (ref 3.5–5.2)
PROT SERPL-MCNC: 7.7 G/DL (ref 6–8.5)
RBC # BLD AUTO: 5 X10E6/UL (ref 4.14–5.8)
SODIUM SERPL-SCNC: 140 MMOL/L (ref 134–144)
TRIGL SERPL-MCNC: 234 MG/DL (ref 0–149)
TSH SERPL DL<=0.005 MIU/L-ACNC: 3.48 UIU/ML (ref 0.45–4.5)
VLDLC SERPL CALC-MCNC: 41 MG/DL (ref 5–40)
WBC # BLD AUTO: 7.1 X10E3/UL (ref 3.4–10.8)

## 2021-11-08 DIAGNOSIS — E78.5 HYPERLIPIDEMIA, UNSPECIFIED HYPERLIPIDEMIA TYPE: ICD-10-CM

## 2021-11-08 DIAGNOSIS — I77.9 BILATERAL CAROTID ARTERY DISEASE, UNSPECIFIED TYPE (HCC): ICD-10-CM

## 2021-11-08 RX ORDER — ATORVASTATIN CALCIUM 40 MG/1
40 TABLET, FILM COATED ORAL NIGHTLY
Qty: 90 TABLET | Refills: 1 | Status: SHIPPED | OUTPATIENT
Start: 2021-11-08

## 2021-11-08 NOTE — PROGRESS NOTES
Please call patient as I dont think he uses mychart. Labs are stable. His microalbumin is elevated due to his A1c being uncontrolled for awhile. His A1c is better now. Continue to work on diabetic diet and we will monitor this. No other treatment needed at this time. Cholesterol is still mildly elevated. I am increasing his cholesterol medication to 40mg.

## 2021-11-11 ENCOUNTER — TREATMENT (OUTPATIENT)
Dept: PHYSICAL THERAPY | Facility: CLINIC | Age: 51
End: 2021-11-11

## 2021-11-11 DIAGNOSIS — M54.12 CERVICAL RADICULOPATHY: Primary | ICD-10-CM

## 2021-11-11 PROCEDURE — 97140 MANUAL THERAPY 1/> REGIONS: CPT | Performed by: PHYSICAL THERAPIST

## 2021-11-11 PROCEDURE — 97110 THERAPEUTIC EXERCISES: CPT | Performed by: PHYSICAL THERAPIST

## 2021-11-11 PROCEDURE — 97112 NEUROMUSCULAR REEDUCATION: CPT | Performed by: PHYSICAL THERAPIST

## 2021-11-11 NOTE — PROGRESS NOTES
Physical Therapy Daily Progress Note/ Re-assessment      TOTAL TIME: 60 MINUTES    Dominick Kumar reports: got an injection in the neck; the right arm pain is a lot less- still has some arm pain- neck is still very stiff, pops and cracks ; go back to see him on Dec 9th ;    Pain 4/10 currently in the right upper trap area     Objective   See Exercise, Manual, and Modality Logs for complete treatment.     THERAPEUTIC EXERCISES/ACTIVITIES ADDED TODAY: pulleys, mat slides, scap retractions, backwards shoulder rolls ; supine cervical AROM rotation, chin tucks     Right shoulder AROM flexion: 130 degrees  Cervical rotation limited to right, pain in CT jxn area     strength:   Right 92#  Left 96#    Manual: cervical mobilizations, STM right cervical p/s; UT/LS stx     Ice to right cervical spine after therapy x 10 minutes     Assessment/Plan  Patient having less pain after injection; limited ROM and tolerance to exercise and loading; much improved  strength for right UE;  may benefit from PT; continue towards previously stated goals    Progress per Plan of Care 1-2 x per week           Manual Therapy:    15     mins  50055;  Therapeutic Exercise:    15     mins  39825;     Neuromuscular Nicanor:    15    mins  19852;    Therapeutic Activity:          mins  67016;     Gait Training:           mins  76306;     Ultrasound:          mins  90508;    Electrical Stimulation:         mins  75208 ( );  Dry Needling          mins self-pay    Timed Treatment:   45   mins   Total Treatment:     60   mins    DANNY Oliva, PT  Physical Therapist

## 2021-12-02 ENCOUNTER — TELEPHONE (OUTPATIENT)
Dept: NEUROLOGY | Facility: CLINIC | Age: 51
End: 2021-12-02

## 2021-12-02 NOTE — TELEPHONE ENCOUNTER
----- Message from AMA Trejo sent at 12/2/2021 12:07 PM EST -----  Can you call him and reschedule his appointment for today for 3 months from now. I have called him and talked to him so he knows he does not need the appointment for 1545 today. Thanks.

## 2021-12-02 NOTE — TELEPHONE ENCOUNTER
Called and spoke to the patient. He has not received his new CPAP machine from [facility] yet.  The order was sent over in September 2021. I will contact [facility] and see if he can be moved up on the priority list. The patient is using a full face mask and although he has reservations about using his recalled CPAP machine, says he can not sleep without it. Will schedule a follow up appointment with him in 3 months, in anticipation he receives his new machine within the next few weeks. He is using a bacterial in line filter currently.

## 2021-12-03 ENCOUNTER — TELEPHONE (OUTPATIENT)
Dept: NEUROLOGY | Facility: CLINIC | Age: 51
End: 2021-12-03

## 2021-12-03 NOTE — TELEPHONE ENCOUNTER
----- Message from AMA Trejo sent at 12/2/2021 12:06 PM EST -----  Can you please call [facility]/Aníbal and ask that this patient be moved up on the priority list. Thanks.

## 2021-12-03 NOTE — TELEPHONE ENCOUNTER
Called and spoke with Aníbal with [facility]. He stated he would let his Branch know to move the patient up on the priority list.

## 2021-12-20 ENCOUNTER — TELEPHONE (OUTPATIENT)
Dept: PAIN MEDICINE | Facility: CLINIC | Age: 51
End: 2021-12-20

## 2021-12-20 NOTE — TELEPHONE ENCOUNTER
Spoke with pt and advised that we don't have any appointments any sooner than his current appointment. Pt stated that he's having more neck stiffness than usual, and can barely lift his arm up to use.   Pt understood.

## 2021-12-20 NOTE — TELEPHONE ENCOUNTER
Caller: PRATIK BARAJAS    Relationship to patient: SELF    Best call back number: [phone]    Type of visit: F/U    If rescheduling, when is the original appointment: 1-21-22     Additional notes: PATIENT RESCHEDULED HIS CANCELLED 12-9-21 APPT & IS NOW SCHEDULED ON 1-31-22. HE WOULD LIKE A CALL BACK TO DISCUSS POSSIBLY BEING WORKED IN SOONER.

## 2021-12-29 ENCOUNTER — TREATMENT (OUTPATIENT)
Dept: PHYSICAL THERAPY | Facility: CLINIC | Age: 51
End: 2021-12-29

## 2021-12-29 DIAGNOSIS — M54.12 CERVICAL RADICULOPATHY: Primary | ICD-10-CM

## 2021-12-29 PROCEDURE — 97161 PT EVAL LOW COMPLEX 20 MIN: CPT | Performed by: PHYSICAL THERAPIST

## 2021-12-29 NOTE — PROGRESS NOTES
Physical Therapy Initial Evaluation and Plan of Care      Patient: Dominick Kumar   : 1970  Diagnosis/ICD-10 Code:  Cervical radiculopathy [M54.12]  Referring practitioner: Taye Curran MD    Subjective Evaluation    History of Present Illness  Date of onset: 2021  Mechanism of injury: Pt reports getting a round of steroid injections in the neck about 2 months ago. Pt states they helped for a few weeks and now he is back to normal. Pt reports that he does not take ibuprofen due to prolonged use about a year ago. Pt reports that when he had a colonoscopy he was told to avoid taking ibuprofen. Pt reports that generic medicine does not help as much. Pt reports that his pain begins in the neck and extends into the shoulder and tingling in the R hand and 4th and 5th digits. Pt reports the R side of his neck feels swollen. Pt reports that when he lifts with the R arm his arm starts shaking and he cannot do it. Pt reports that his R arm hurts and is weak but cannot give definitive times when it happens or causes.       Patient Occupation: unemployed Pain  Current pain ratin  At best pain ratin  At worst pain ratin  Location: neck/r shoulder  Quality: dull ache and throbbing  Relieving factors: medications  Progression: worsening    Hand dominance: ambidextrous    Diagnostic Tests  No diagnostic tests performed    Treatments  Previous treatment: injection treatment and medication  Current treatment: medication  Patient Goals  Patient goals for therapy: decreased pain             Objective    Patient with slight relief with distraction.        Palpation   Left   Hypertonic in the upper trapezius.     Right   Hypertonic in the upper trapezius.     Tenderness   Cervical Spine   Tenderness in the spinous process.     Neurological Testing     Reflexes   Left   Biceps (C5/C6): trace (1+)  Brachioradialis (C6): absent (0)  Triceps (C7): trace (1+)    Right   Biceps (C5/C6): absent (0)  Brachioradialis  (C6): absent (0)    Comments   Right Triceps (C7): not tested due to pain in position    Active Range of Motion   Cervical/Thoracic Spine   Cervical    Flexion: 41 degrees   Extension: 7 degrees   Left lateral flexion: 17 degrees   Right lateral flexion: 16 degrees   Left rotation: 58 degrees   Right rotation: 46 degrees     Strength/Myotome Testing     Left Wrist/Hand      (2nd hand position)     Trial 1: 55 lbs    Trial 2: 50 lbs    Trial 3: 52 lbs    Average: 52.33 lbs    Right Wrist/Hand      (2nd hand position)     Trial 1: 85 lbs    Trial 2: 85 lbs    Trial 3: 85 lbs    Average: 85 lbs    Tests   Cervical     Left   Negative Spurling's sign.     Right   Positive Spurling's sign.     Additional Tests Details  Median ULTT positive on R UE.           Assessment & Plan     Assessment  Impairments: abnormal muscle tone, abnormal or restricted ROM, activity intolerance, impaired physical strength, lacks appropriate home exercise program and pain with function  Functional Limitations: lifting, uncomfortable because of pain and reaching overhead  Assessment details: Patient is a 51 year old male who comes to physical therapy with chronic neck and R shoulder pain. Signs and symptoms are consistent with cervical radiculopathy resulting in pain, decreased ROM, decreased strength, and inability to perform all essential functional activities. Pt with poor posture. Pt will benefit from skilled PT services to address the above issues.       Barriers to therapy: home life, poor health, diabetes, anxiety  Prognosis: poor    Goals  Plan Goals: SHORT TERM GOALS:     2 weeks  1. Pt independent with HEP  2. Pt to demonstrate cervical AROM 50-75% of expected norms to allow for improved ability to perform ADL's  3. Pt to report not having any headaches related to neck pain for the past 3 days    LONG TERM GOALS:   6 weeks  1. Pt to demonstrate cervical AROM % of expected norms to allow for improved safety when  driving  2. Pt to demonstrate ability to lift 10# [place] with right arm(s) without increase in pain in the neck   3. Pt to report being able to work in the home without increase in pain in the neck      Plan  Therapy options: will be seen for skilled therapy services  Planned modality interventions: cryotherapy, thermotherapy (hydrocollator packs), TENS, ultrasound and dry needling  Planned therapy interventions: body mechanics training, fine motor coordination training, flexibility, home exercise program, joint mobilization, manual therapy, motor coordination training, neuromuscular re-education, soft tissue mobilization, spinal/joint mobilization, strengthening, postural training, stretching and therapeutic activities  Frequency: 1x week  Duration in weeks: 10  Treatment plan discussed with: patient        Manual Therapy:         mins  83917;  Therapeutic Exercise:         mins  28191;     Neuromuscular Nicanor:        mins  65785;    Therapeutic Activity:          mins  00046;     Gait Training:           mins  04158;     Ultrasound:          mins  08272;    Electrical Stimulation:         mins  66981 ( );  Dry Needling          mins self-pay    Timed Treatment:      mins   Total Treatment:     44   mins    PT SIGNATURE: Ronald Buitrago PT   [place] License: [id number]  DATE TREATMENT INITIATED: 12/29/2021    Initial Certification  Certification Period: 3/28/2022  I certify that the therapy services are furnished while this patient is under my care.  The services outlined above are required by this patient, and will be reviewed every 90 days.     PHYSICIAN: Taye Curran MD      DATE:     Please sign and return via fax to [phone].. Thank you, Psychiatric Physical Therapy.

## 2022-01-05 ENCOUNTER — TELEPHONE (OUTPATIENT)
Dept: FAMILY MEDICINE CLINIC | Facility: CLINIC | Age: 52
End: 2022-01-05

## 2022-01-05 NOTE — TELEPHONE ENCOUNTER
Patient called and said that he had take both of his BP medication together and he usually takes one in the morning and one in the afternoon.  Spoke with Dr Porter and told patient to drink plenty of water and monitor for symptoms.    Patient also said that he was unable to afford his Tramadol and wanted to know if you could send something more affordable into the pharmacy.      (patient asked that we leave a message if he does not answer)

## 2022-01-13 DIAGNOSIS — I10 ESSENTIAL HYPERTENSION: ICD-10-CM

## 2022-01-13 RX ORDER — AMLODIPINE BESYLATE 10 MG/1
TABLET ORAL
Qty: 90 TABLET | Refills: 0 | Status: SHIPPED | OUTPATIENT
Start: 2022-01-13 | End: 2022-02-21 | Stop reason: SDUPTHER

## 2022-01-13 NOTE — TELEPHONE ENCOUNTER
Caller: Dominick Kumar    Relationship: Self    Best call back number: [phone]    Requested Prescriptions:   Requested Prescriptions     Pending Prescriptions Disp Refills   • amLODIPine (NORVASC) 10 MG tablet [Pharmacy Med Name: amLODIPine Besylate Oral Tablet 10 MG] 90 tablet 0     Sig: TAKE 1 TABLET BY MOUTH EVERY DAY        Pharmacy where request should be sent: [facility] [address] [place] [address], [place] - [address]  [phone] [facility] [phone] FX     Additional details provided by patient:     Does the patient have less than a 3 day supply:  [x] Yes  [] No    SiShannon Chester Rep   01/13/22 09:22 EST

## 2022-02-21 ENCOUNTER — TELEPHONE (OUTPATIENT)
Dept: FAMILY MEDICINE CLINIC | Facility: CLINIC | Age: 52
End: 2022-02-21

## 2022-02-21 DIAGNOSIS — M54.12 CERVICAL RADICULOPATHY: ICD-10-CM

## 2022-02-21 DIAGNOSIS — I10 ESSENTIAL HYPERTENSION: ICD-10-CM

## 2022-02-21 DIAGNOSIS — E11.9 TYPE 2 DIABETES MELLITUS WITHOUT COMPLICATION, WITHOUT LONG-TERM CURRENT USE OF INSULIN: ICD-10-CM

## 2022-02-21 RX ORDER — TRAMADOL HYDROCHLORIDE 50 MG/1
50 TABLET ORAL 2 TIMES DAILY PRN
Qty: 60 TABLET | Refills: 0 | Status: SHIPPED | OUTPATIENT
Start: 2022-02-21

## 2022-02-21 RX ORDER — TRAMADOL HYDROCHLORIDE 50 MG/1
TABLET ORAL
Qty: 60 TABLET | Refills: 0 | OUTPATIENT
Start: 2022-02-21

## 2022-02-21 RX ORDER — AMLODIPINE BESYLATE 10 MG/1
10 TABLET ORAL DAILY
Qty: 90 TABLET | Refills: 0 | Status: SHIPPED | OUTPATIENT
Start: 2022-02-21

## 2022-02-21 NOTE — TELEPHONE ENCOUNTER
Caller: Dominick Kumar    Relationship: Self    Best call back number: [phone]    Requested Prescriptions:   Requested Prescriptions     Pending Prescriptions Disp Refills   • traMADol (ULTRAM) 50 MG tablet 60 tablet 3     Sig: Take 1 tablet by mouth 2 (Two) Times a Day As Needed for Moderate Pain .   • amLODIPine (NORVASC) 10 MG tablet 90 tablet 0     Sig: Take 1 tablet by mouth Daily.    METFORMIN- LOST HIS METFORMIN MEDICATION    Pharmacy where request should be sent: [facility] [id number] - [place], [place] - [address] [facility] [phone] [facility] [phone] FX     Additional details provided by patient: PATIENT OUT OF MEDICATIONS- PLEASE NOTE PHARMACY CHANGE TO [facility] IN [place], [place]- PATIENT REQUESTING A 90 DAY SUPPLY ON ALL MEDICATIONS.LOST HIS METFORMIN MEDICATION  NOTE PATIENT MOVED TO [place]- STILL HAS [place] INSURANCE UNTIL 3/1/22 PER INSURANCE COMPANY LETTER RECEIVED IN MAIL    Does the patient have less than a 3 day supply:  [x] Yes  [] No    Shannon Barron Rep   02/21/22 14:25 EST

## 2024-10-09 NOTE — PROGRESS NOTES
Subjective     Chief Complaint:    Chief Complaint   Patient presents with   • Follow-up       History of Present Illness:   He takes benazeprl and norvasc for HTN. Tolerating without side effects.   He is taking metformin and glipizide for diabetes. Struggles with diet and exercise at times. Sometimes forget his second dose of medication  He takes statin for HLD.   Saw ortho for his shoulder and PT was recommended. He starts tomorrow. Says he is out of tramadol.   He has refractory depression. Takes cymbalta. No SI/HI. Feels like his mood is ok but has been having some anxiety.   He is having some trouble sleeping and vivid dreams. He has NATALIE and trouble with his mask. He has upcoming appt with sleep medicine.   GERD controlled with PPI    Review of Systems  Gen- No fevers, chills  CV- No chest pain, palpitations  Resp- No cough, dyspnea  GI- No N/V/D, abd pain  Neuro-No dizziness, headaches      I have reviewed and/or updated the patient's past medical, surgical, family, social history and problem list as appropriate.     Medications:    Current Outpatient Medications:   •  amLODIPine (NORVASC) 10 MG tablet, Take 1 tablet by mouth Daily., Disp: 90 tablet, Rfl: 1  •  aspirin 81 MG EC tablet, Take 81 mg by mouth Daily., Disp: , Rfl:   •  atorvastatin (Lipitor) 20 MG tablet, Take 1 tablet by mouth Every Night., Disp: 90 tablet, Rfl: 1  •  benazepril (LOTENSIN) 20 MG tablet, Take 1 tablet by mouth Daily., Disp: 30 tablet, Rfl: 5  •  Blood Glucose Monitoring Suppl (ONE TOUCH ULTRA 2) w/Device kit, USE TO CHECK BLOOD GLUCOSE ONCE DAILY OR AS DIRECTED, Disp: , Rfl:   •  DULoxetine (CYMBALTA) 60 MG capsule, Take 1 capsule by mouth Daily., Disp: 30 capsule, Rfl: 5  •  glucose blood test strip, Check glucose once daily, Disp: 25 each, Rfl: 12  •  glucose monitor monitoring kit, Check glucose once daily, Disp: 1 each, Rfl: 0  •  metFORMIN (GLUCOPHAGE) 1000 MG tablet, Take 1 tablet by mouth 2 (Two) Times a Day With  Please see below note, Pt concerned reported Sx related to Escitalopram. Reports numbness and tingling t/o extremities.     Pt has appt 10/31/24.   "Meals. Take 1 tablet by mouth once daily x 1 week, then increase to 1 tablet twice daily, Disp: 60 tablet, Rfl: 5  •  Multiple Vitamins-Minerals (MULTIVITAMIN WITH MINERALS) tablet tablet, Take 1 tablet by mouth Daily., Disp: , Rfl:   •  pantoprazole (PROTONIX) 20 MG EC tablet, Take 1 tablet by mouth Daily., Disp: 90 tablet, Rfl: 1  •  etodolac (LODINE) 200 MG capsule, Take 1 capsule by mouth Every 8 (Eight) Hours., Disp: 15 capsule, Rfl: 0  •  glipizide (GLUCOTROL XL) 10 MG 24 hr tablet, Take 1 tablet by mouth Daily., Disp: 90 tablet, Rfl: 1  •  methocarbamol (ROBAXIN) 750 MG tablet, Take 1 tablet by mouth 2 (Two) Times a Day As Needed for Muscle Spasms., Disp: 60 tablet, Rfl: 5  •  ondansetron ODT (ZOFRAN-ODT) 4 MG disintegrating tablet, Place 1 tablet on the tongue Every 6 (Six) Hours As Needed for Nausea or Vomiting., Disp: 20 tablet, Rfl: 0  •  SITagliptin (Januvia) 100 MG tablet, Take 1 tablet by mouth Daily., Disp: 90 tablet, Rfl: 1  •  traMADol (ULTRAM) 50 MG tablet, Take 1 tablet by mouth 2 (Two) Times a Day As Needed for Moderate Pain ., Disp: 60 tablet, Rfl: 3    Allergies:  Allergies   Allergen Reactions   • Corticosteroids Other (See Comments)     hypertension   • Erythromycin Photosensitivity     headache       Objective     Vital Signs:   Vitals:    08/04/21 1548   BP: 138/88   Pulse: 108   SpO2: 95%   Weight: (!) 154 kg (339 lb)   Height: 193 cm (75.98\")       Physical Exam:    Physical Exam  Vitals and nursing note reviewed.   Constitutional:       Appearance: He is well-developed.   HENT:      Head: Normocephalic and atraumatic.   Eyes:      Pupils: Pupils are equal, round, and reactive to light.   Cardiovascular:      Rate and Rhythm: Normal rate and regular rhythm.      Heart sounds: Normal heart sounds.   Pulmonary:      Effort: Pulmonary effort is normal.      Breath sounds: Normal breath sounds.   Abdominal:      General: Bowel sounds are normal. There is no distension.      Palpations: " Abdomen is soft.      Tenderness: There is no abdominal tenderness.   Musculoskeletal:      Cervical back: Neck supple.   Skin:     General: Skin is warm and dry.      Capillary Refill: Capillary refill takes less than 2 seconds.   Neurological:      General: No focal deficit present.      Mental Status: He is alert and oriented to person, place, and time.   Psychiatric:         Mood and Affect: Mood normal.         Behavior: Behavior normal.         Body mass index is 41.29 kg/m².    Assessment / Plan     Assessment/Plan:   Problem List Items Addressed This Visit        Cardiac and Vasculature    Hypertension    Relevant Medications    benazepril (LOTENSIN) 20 MG tablet    amLODIPine (NORVASC) 10 MG tablet    Other Relevant Orders    Comprehensive Metabolic Panel    CBC Auto Differential    Hyperlipidemia    Relevant Medications    atorvastatin (Lipitor) 20 MG tablet    Other Relevant Orders    Lipid Panel       Endocrine and Metabolic    Type 2 diabetes mellitus without complication, without long-term current use of insulin (CMS/[facility]) - Primary    Relevant Medications    metFORMIN (GLUCOPHAGE) 1000 MG tablet    glucose blood test strip    glucose monitor monitoring kit    glipizide (GLUCOTROL XL) 10 MG 24 hr tablet    SITagliptin (Januvia) 100 MG tablet    Other Relevant Orders    TSH    Hemoglobin A1c    POC Glycosylated Hemoglobin (Hb A1C) (Completed)       Gastrointestinal Abdominal     Gastroesophageal reflux disease without esophagitis    Relevant Medications    pantoprazole (PROTONIX) 20 MG EC tablet       Mental Health    Current moderate episode of major depressive disorder without prior episode (CMS/HCC)    Relevant Medications    DULoxetine (CYMBALTA) 60 MG capsule       Musculoskeletal and Injuries    Nontraumatic incomplete tear of right rotator cuff    Impingement syndrome of right shoulder       Sleep    NATALIE (obstructive sleep apnea)      Other Visit Diagnoses     Cervical radiculopathy         Relevant Medications    traMADol (ULTRAM) 50 MG tablet        -- A1c 8.6. Change glipizide to XR. Continue metformin. Add Januvia. Work on diabetic diet.   --Blood pressure controlled. Continue amlodipine, Benzapril  --Continue Cymbalta for mood.  He does have refractory depression.  We have discussed counseling before he declines   --Continue statin  -- keep f/u with sleep medicine  -- continue tramadol as needed for pain. He has hx of gastritis and GI has recommended that he avoid NSAIDs    Follow up:  Return in about 3 months (around 11/4/2021).      Electronically signed by AMA Ewing   08/04/2021 14:47 EDT      Please note that portions of this note may have been completed with a voice recognition program. Efforts were made to edit the dictations, but occasionally words are mistranscribed.

## (undated) DEVICE — Device

## (undated) DEVICE — VLV SXN AIR/H2O ORCAPOD3 1P/U STRL

## (undated) DEVICE — ENDOSCOPY PORT CONNECTOR FOR OLYMPUS® SCOPES: Brand: ERBE

## (undated) DEVICE — LUBE JELLY PK/2.75GM STRL BX/144

## (undated) DEVICE — HYBRID TUBING/CAP SET FOR OLYMPUS® SCOPES: Brand: ERBE

## (undated) DEVICE — PAD GRND REM POLYHESIVE A/ DISP

## (undated) DEVICE — FRCP BIOP COLD ENDOJAW ALLGTR W/NDL 2.8X2300MM BLU